# Patient Record
Sex: MALE | Race: WHITE | NOT HISPANIC OR LATINO
[De-identification: names, ages, dates, MRNs, and addresses within clinical notes are randomized per-mention and may not be internally consistent; named-entity substitution may affect disease eponyms.]

---

## 2018-05-31 ENCOUNTER — APPOINTMENT (OUTPATIENT)
Dept: GASTROENTEROLOGY | Facility: CLINIC | Age: 73
End: 2018-05-31
Payer: MEDICARE

## 2018-05-31 VITALS
HEART RATE: 78 BPM | DIASTOLIC BLOOD PRESSURE: 80 MMHG | HEIGHT: 66 IN | SYSTOLIC BLOOD PRESSURE: 120 MMHG | OXYGEN SATURATION: 98 % | WEIGHT: 225 LBS | BODY MASS INDEX: 36.16 KG/M2

## 2018-05-31 DIAGNOSIS — B18.2 CHRONIC VIRAL HEPATITIS C: ICD-10-CM

## 2018-05-31 DIAGNOSIS — Z86.010 PERSONAL HISTORY OF COLONIC POLYPS: ICD-10-CM

## 2018-05-31 DIAGNOSIS — Z86.19 PERSONAL HISTORY OF OTHER INFECTIOUS AND PARASITIC DISEASES: ICD-10-CM

## 2018-05-31 PROCEDURE — 99204 OFFICE O/P NEW MOD 45 MIN: CPT

## 2018-06-07 ENCOUNTER — CHART COPY (OUTPATIENT)
Age: 73
End: 2018-06-07

## 2018-06-07 RX ORDER — POLYETHYLENE GLYCOL-3350 AND ELECTROLYTES 236; 6.74; 5.86; 2.97; 22.74 G/274.31G; G/274.31G; G/274.31G; G/274.31G; G/274.31G
236 POWDER, FOR SOLUTION ORAL
Qty: 1 | Refills: 0 | Status: ACTIVE | COMMUNITY
Start: 2018-06-07 | End: 1900-01-01

## 2018-07-11 ENCOUNTER — APPOINTMENT (OUTPATIENT)
Dept: GASTROENTEROLOGY | Facility: AMBULATORY MEDICAL SERVICES | Age: 73
End: 2018-07-11
Payer: MEDICARE

## 2018-07-11 PROCEDURE — 45380 COLONOSCOPY AND BIOPSY: CPT

## 2021-11-08 ENCOUNTER — APPOINTMENT (OUTPATIENT)
Dept: ORTHOPEDIC SURGERY | Facility: CLINIC | Age: 76
End: 2021-11-08
Payer: MEDICARE

## 2021-11-08 VITALS
DIASTOLIC BLOOD PRESSURE: 81 MMHG | HEIGHT: 66 IN | WEIGHT: 230 LBS | SYSTOLIC BLOOD PRESSURE: 162 MMHG | BODY MASS INDEX: 36.96 KG/M2 | HEART RATE: 81 BPM

## 2021-11-08 PROCEDURE — 73562 X-RAY EXAM OF KNEE 3: CPT | Mod: 50

## 2021-11-08 PROCEDURE — 99203 OFFICE O/P NEW LOW 30 MIN: CPT

## 2021-11-08 RX ORDER — OXYCODONE HYDROCHLORIDE 15 MG/1
15 TABLET ORAL
Qty: 120 | Refills: 0 | Status: ACTIVE | COMMUNITY
Start: 2021-10-08

## 2021-11-08 RX ORDER — NAPROXEN 500 MG/1
500 TABLET ORAL
Qty: 180 | Refills: 0 | Status: ACTIVE | COMMUNITY
Start: 2021-05-07

## 2021-11-08 RX ORDER — BACLOFEN 10 MG/1
10 TABLET ORAL
Qty: 60 | Refills: 0 | Status: ACTIVE | COMMUNITY
Start: 2021-05-14

## 2021-11-08 RX ORDER — FUROSEMIDE 40 MG/1
40 TABLET ORAL
Qty: 90 | Refills: 0 | Status: ACTIVE | COMMUNITY
Start: 2021-08-27

## 2022-04-18 ENCOUNTER — APPOINTMENT (OUTPATIENT)
Dept: ORTHOPEDIC SURGERY | Facility: CLINIC | Age: 77
End: 2022-04-18
Payer: MEDICARE

## 2022-04-18 DIAGNOSIS — Z96.652 PRESENCE OF LEFT ARTIFICIAL KNEE JOINT: ICD-10-CM

## 2022-04-18 DIAGNOSIS — M17.11 UNILATERAL PRIMARY OSTEOARTHRITIS, RIGHT KNEE: ICD-10-CM

## 2022-04-18 PROCEDURE — 73562 X-RAY EXAM OF KNEE 3: CPT | Mod: RT

## 2022-04-18 PROCEDURE — 99213 OFFICE O/P EST LOW 20 MIN: CPT

## 2022-04-19 ENCOUNTER — OUTPATIENT (OUTPATIENT)
Dept: OUTPATIENT SERVICES | Facility: HOSPITAL | Age: 77
LOS: 1 days | End: 2022-04-19
Payer: MEDICARE

## 2022-04-19 VITALS
TEMPERATURE: 98 F | RESPIRATION RATE: 16 BRPM | HEART RATE: 89 BPM | SYSTOLIC BLOOD PRESSURE: 146 MMHG | DIASTOLIC BLOOD PRESSURE: 92 MMHG | WEIGHT: 231.93 LBS | HEIGHT: 66 IN | OXYGEN SATURATION: 97 %

## 2022-04-19 DIAGNOSIS — Z98.890 OTHER SPECIFIED POSTPROCEDURAL STATES: Chronic | ICD-10-CM

## 2022-04-19 DIAGNOSIS — M17.11 UNILATERAL PRIMARY OSTEOARTHRITIS, RIGHT KNEE: ICD-10-CM

## 2022-04-19 DIAGNOSIS — Z96.652 PRESENCE OF LEFT ARTIFICIAL KNEE JOINT: Chronic | ICD-10-CM

## 2022-04-19 LAB
A1C WITH ESTIMATED AVERAGE GLUCOSE RESULT: 5.6 % — SIGNIFICANT CHANGE UP (ref 4–5.6)
ALBUMIN SERPL ELPH-MCNC: 4.4 G/DL — SIGNIFICANT CHANGE UP (ref 3.3–5)
ALP SERPL-CCNC: 68 U/L — SIGNIFICANT CHANGE UP (ref 40–120)
ALT FLD-CCNC: 43 U/L — HIGH (ref 4–41)
ANION GAP SERPL CALC-SCNC: 13 MMOL/L — SIGNIFICANT CHANGE UP (ref 7–14)
APPEARANCE UR: CLEAR — SIGNIFICANT CHANGE UP
AST SERPL-CCNC: 26 U/L — SIGNIFICANT CHANGE UP (ref 4–40)
BILIRUB SERPL-MCNC: 0.4 MG/DL — SIGNIFICANT CHANGE UP (ref 0.2–1.2)
BILIRUB UR-MCNC: NEGATIVE — SIGNIFICANT CHANGE UP
BLD GP AB SCN SERPL QL: NEGATIVE — SIGNIFICANT CHANGE UP
BUN SERPL-MCNC: 13 MG/DL — SIGNIFICANT CHANGE UP (ref 7–23)
CALCIUM SERPL-MCNC: 9.2 MG/DL — SIGNIFICANT CHANGE UP (ref 8.4–10.5)
CHLORIDE SERPL-SCNC: 105 MMOL/L — SIGNIFICANT CHANGE UP (ref 98–107)
CO2 SERPL-SCNC: 22 MMOL/L — SIGNIFICANT CHANGE UP (ref 22–31)
COLOR SPEC: YELLOW — SIGNIFICANT CHANGE UP
CREAT SERPL-MCNC: 1.04 MG/DL — SIGNIFICANT CHANGE UP (ref 0.5–1.3)
DIFF PNL FLD: NEGATIVE — SIGNIFICANT CHANGE UP
EGFR: 74 ML/MIN/1.73M2 — SIGNIFICANT CHANGE UP
ESTIMATED AVERAGE GLUCOSE: 114 — SIGNIFICANT CHANGE UP
GLUCOSE SERPL-MCNC: 108 MG/DL — HIGH (ref 70–99)
GLUCOSE UR QL: NEGATIVE — SIGNIFICANT CHANGE UP
HCT VFR BLD CALC: 43.1 % — SIGNIFICANT CHANGE UP (ref 39–50)
HGB BLD-MCNC: 14 G/DL — SIGNIFICANT CHANGE UP (ref 13–17)
KETONES UR-MCNC: ABNORMAL
LEUKOCYTE ESTERASE UR-ACNC: NEGATIVE — SIGNIFICANT CHANGE UP
MCHC RBC-ENTMCNC: 28.6 PG — SIGNIFICANT CHANGE UP (ref 27–34)
MCHC RBC-ENTMCNC: 32.5 GM/DL — SIGNIFICANT CHANGE UP (ref 32–36)
MCV RBC AUTO: 88 FL — SIGNIFICANT CHANGE UP (ref 80–100)
MRSA PCR RESULT.: SIGNIFICANT CHANGE UP
NITRITE UR-MCNC: NEGATIVE — SIGNIFICANT CHANGE UP
NRBC # BLD: 0 /100 WBCS — SIGNIFICANT CHANGE UP
NRBC # FLD: 0 K/UL — SIGNIFICANT CHANGE UP
PH UR: 5 — SIGNIFICANT CHANGE UP (ref 5–8)
PLATELET # BLD AUTO: 286 K/UL — SIGNIFICANT CHANGE UP (ref 150–400)
POTASSIUM SERPL-MCNC: 4.2 MMOL/L — SIGNIFICANT CHANGE UP (ref 3.5–5.3)
POTASSIUM SERPL-SCNC: 4.2 MMOL/L — SIGNIFICANT CHANGE UP (ref 3.5–5.3)
PROT SERPL-MCNC: 7.5 G/DL — SIGNIFICANT CHANGE UP (ref 6–8.3)
PROT UR-MCNC: NEGATIVE — SIGNIFICANT CHANGE UP
RBC # BLD: 4.9 M/UL — SIGNIFICANT CHANGE UP (ref 4.2–5.8)
RBC # FLD: 14 % — SIGNIFICANT CHANGE UP (ref 10.3–14.5)
RH IG SCN BLD-IMP: POSITIVE — SIGNIFICANT CHANGE UP
S AUREUS DNA NOSE QL NAA+PROBE: SIGNIFICANT CHANGE UP
SODIUM SERPL-SCNC: 140 MMOL/L — SIGNIFICANT CHANGE UP (ref 135–145)
SP GR SPEC: 1.02 — SIGNIFICANT CHANGE UP (ref 1–1.05)
UROBILINOGEN FLD QL: SIGNIFICANT CHANGE UP
WBC # BLD: 8.2 K/UL — SIGNIFICANT CHANGE UP (ref 3.8–10.5)
WBC # FLD AUTO: 8.2 K/UL — SIGNIFICANT CHANGE UP (ref 3.8–10.5)

## 2022-04-19 PROCEDURE — 93010 ELECTROCARDIOGRAM REPORT: CPT

## 2022-04-19 RX ORDER — SODIUM CHLORIDE 9 MG/ML
1000 INJECTION, SOLUTION INTRAVENOUS
Refills: 0 | Status: DISCONTINUED | OUTPATIENT
Start: 2022-05-05 | End: 2022-05-08

## 2022-04-19 RX ORDER — SODIUM CHLORIDE 9 MG/ML
3 INJECTION INTRAMUSCULAR; INTRAVENOUS; SUBCUTANEOUS EVERY 8 HOURS
Refills: 0 | Status: DISCONTINUED | OUTPATIENT
Start: 2022-05-05 | End: 2022-05-08

## 2022-04-19 RX ORDER — OMEGA-3 ACID ETHYL ESTERS 1 G
1 CAPSULE ORAL
Qty: 0 | Refills: 0 | DISCHARGE

## 2022-04-19 RX ORDER — PANTOPRAZOLE SODIUM 20 MG/1
40 TABLET, DELAYED RELEASE ORAL
Refills: 0 | Status: DISCONTINUED | OUTPATIENT
Start: 2022-05-05 | End: 2022-05-08

## 2022-04-19 NOTE — H&P PST ADULT - NSANTHOSAYNRD_GEN_A_CORE
No. RAYA screening performed.  STOP BANG Legend: 0-2 = LOW Risk; 3-4 = INTERMEDIATE Risk; 5-8 = HIGH Risk

## 2022-04-19 NOTE — H&P PST ADULT - MUSCULOSKELETAL COMMENTS
preop dx of primary OA of right knee. see HPI +crepitus of right knee with flexion and extension. preop dx of unilateral primary OA, right knee. See HPI

## 2022-04-19 NOTE — H&P PST ADULT - NSICDXFAMILYHX_GEN_ALL_CORE_FT
FAMILY HISTORY:  Mother  Still living? Unknown  FH: ovarian cancer, Age at diagnosis: Age Unknown

## 2022-04-19 NOTE — H&P PST ADULT - NSICDXPASTMEDICALHX_GEN_ALL_CORE_FT
PAST MEDICAL HISTORY:  History of hepatitis C 7 years ago, treated    Primary osteoarthritis of right knee

## 2022-04-19 NOTE — H&P PST ADULT - PROBLEM SELECTOR PLAN 1
preop for right total knee replacement on 5/5/22  preop instructions given, pt verbalized understanding  GI prophylaxis and chlorhexidine wash provided  pt is scheduled for COVID testing preop  medical evaluation requested- pt going for high risk surgery preop for right total knee replacement on 5/5/22  preop instructions given, pt verbalized understanding  GI prophylaxis and chlorhexidine wash provided  pt is scheduled for COVID testing preop  medical evaluation requested- pt going for high risk surgery in the presence of elevated diastolic BP

## 2022-04-19 NOTE — H&P PST ADULT - HISTORY OF PRESENT ILLNESS
77 y/o male osteoarthritis of the right knee presents to PST preop for right total knee replacement. pt reports right knee pain x1 year. s/p multiple right knee injections but pt states relief would only last for three months. pt reports "clicking" to the right knee and difficulty climbing stairs.

## 2022-04-19 NOTE — H&P PST ADULT - NSICDXPASTSURGICALHX_GEN_ALL_CORE_FT
PAST SURGICAL HISTORY:  Status post left partial knee replacement      PAST SURGICAL HISTORY:  History of hip surgery s/p excision of right hip fatty tumor 2015    S/P arthroscopy of left shoulder 3/2016    Status post left partial knee replacement 12/2017

## 2022-04-20 ENCOUNTER — TRANSCRIPTION ENCOUNTER (OUTPATIENT)
Age: 77
End: 2022-04-20

## 2022-04-20 LAB
CULTURE RESULTS: NO GROWTH — SIGNIFICANT CHANGE UP
SPECIMEN SOURCE: SIGNIFICANT CHANGE UP

## 2022-05-04 ENCOUNTER — FORM ENCOUNTER (OUTPATIENT)
Age: 77
End: 2022-05-04

## 2022-05-04 NOTE — ASU PATIENT PROFILE, ADULT - TEACHING/LEARNING LEARNING PREFERENCES
Spine appears normal, range of motion is not limited, no muscle or joint tenderness
skill demonstration/verbal instruction/video/written material

## 2022-05-04 NOTE — ASU PATIENT PROFILE, ADULT - FALL HARM RISK - UNIVERSAL INTERVENTIONS
Bed in lowest position, wheels locked, appropriate side rails in place/Call bell, personal items and telephone in reach/Instruct patient to call for assistance before getting out of bed or chair/Non-slip footwear when patient is out of bed/[place] to call system/Physically safe environment - no spills, clutter or unnecessary equipment/Purposeful Proactive Rounding/Room/bathroom lighting operational, light cord in reach

## 2022-05-04 NOTE — ASU PATIENT PROFILE, ADULT - NSICDXPASTSURGICALHX_GEN_ALL_CORE_FT
PAST SURGICAL HISTORY:  History of hip surgery s/p excision of right hip fatty tumor 2015    S/P arthroscopy of left shoulder 3/2016    Status post left partial knee replacement 12/2017

## 2022-05-05 ENCOUNTER — INPATIENT (INPATIENT)
Facility: HOSPITAL | Age: 77
LOS: 2 days | Discharge: HOME HEALTH | End: 2022-05-08
Attending: ORTHOPAEDIC SURGERY | Admitting: ORTHOPAEDIC SURGERY
Payer: MEDICARE

## 2022-05-05 ENCOUNTER — APPOINTMENT (OUTPATIENT)
Dept: ORTHOPEDIC SURGERY | Facility: HOSPITAL | Age: 77
End: 2022-05-05

## 2022-05-05 ENCOUNTER — RESULT REVIEW (OUTPATIENT)
Age: 77
End: 2022-05-05

## 2022-05-05 VITALS
TEMPERATURE: 97 F | SYSTOLIC BLOOD PRESSURE: 147 MMHG | HEIGHT: 66 IN | RESPIRATION RATE: 18 BRPM | HEART RATE: 69 BPM | WEIGHT: 231.93 LBS | OXYGEN SATURATION: 95 % | DIASTOLIC BLOOD PRESSURE: 78 MMHG

## 2022-05-05 DIAGNOSIS — M17.11 UNILATERAL PRIMARY OSTEOARTHRITIS, RIGHT KNEE: ICD-10-CM

## 2022-05-05 DIAGNOSIS — Z98.890 OTHER SPECIFIED POSTPROCEDURAL STATES: Chronic | ICD-10-CM

## 2022-05-05 DIAGNOSIS — Z96.652 PRESENCE OF LEFT ARTIFICIAL KNEE JOINT: Chronic | ICD-10-CM

## 2022-05-05 LAB
ANION GAP SERPL CALC-SCNC: 11 MMOL/L — SIGNIFICANT CHANGE UP (ref 7–14)
BUN SERPL-MCNC: 12 MG/DL — SIGNIFICANT CHANGE UP (ref 7–23)
CALCIUM SERPL-MCNC: 9 MG/DL — SIGNIFICANT CHANGE UP (ref 8.4–10.5)
CHLORIDE SERPL-SCNC: 106 MMOL/L — SIGNIFICANT CHANGE UP (ref 98–107)
CO2 SERPL-SCNC: 23 MMOL/L — SIGNIFICANT CHANGE UP (ref 22–31)
CREAT SERPL-MCNC: 0.98 MG/DL — SIGNIFICANT CHANGE UP (ref 0.5–1.3)
EGFR: 80 ML/MIN/1.73M2 — SIGNIFICANT CHANGE UP
GLUCOSE BLDC GLUCOMTR-MCNC: 108 MG/DL — HIGH (ref 70–99)
GLUCOSE SERPL-MCNC: 127 MG/DL — HIGH (ref 70–99)
HCT VFR BLD CALC: 41.7 % — SIGNIFICANT CHANGE UP (ref 39–50)
HGB BLD-MCNC: 13.9 G/DL — SIGNIFICANT CHANGE UP (ref 13–17)
MCHC RBC-ENTMCNC: 29.1 PG — SIGNIFICANT CHANGE UP (ref 27–34)
MCHC RBC-ENTMCNC: 33.3 GM/DL — SIGNIFICANT CHANGE UP (ref 32–36)
MCV RBC AUTO: 87.2 FL — SIGNIFICANT CHANGE UP (ref 80–100)
NRBC # BLD: 0 /100 WBCS — SIGNIFICANT CHANGE UP
NRBC # FLD: 0 K/UL — SIGNIFICANT CHANGE UP
PLATELET # BLD AUTO: 270 K/UL — SIGNIFICANT CHANGE UP (ref 150–400)
POTASSIUM SERPL-MCNC: 4.2 MMOL/L — SIGNIFICANT CHANGE UP (ref 3.5–5.3)
POTASSIUM SERPL-SCNC: 4.2 MMOL/L — SIGNIFICANT CHANGE UP (ref 3.5–5.3)
RBC # BLD: 4.78 M/UL — SIGNIFICANT CHANGE UP (ref 4.2–5.8)
RBC # FLD: 13.7 % — SIGNIFICANT CHANGE UP (ref 10.3–14.5)
SODIUM SERPL-SCNC: 140 MMOL/L — SIGNIFICANT CHANGE UP (ref 135–145)
WBC # BLD: 8.89 K/UL — SIGNIFICANT CHANGE UP (ref 3.8–10.5)
WBC # FLD AUTO: 8.89 K/UL — SIGNIFICANT CHANGE UP (ref 3.8–10.5)

## 2022-05-05 PROCEDURE — 88311 DECALCIFY TISSUE: CPT | Mod: 26

## 2022-05-05 PROCEDURE — 73560 X-RAY EXAM OF KNEE 1 OR 2: CPT | Mod: 26,RT

## 2022-05-05 PROCEDURE — 88305 TISSUE EXAM BY PATHOLOGIST: CPT | Mod: 26

## 2022-05-05 PROCEDURE — 27447 TOTAL KNEE ARTHROPLASTY: CPT | Mod: RT

## 2022-05-05 DEVICE — IMPLANTABLE DEVICE: Type: IMPLANTABLE DEVICE | Site: RIGHT | Status: FUNCTIONAL

## 2022-05-05 DEVICE — ZIMMER/NEXGEN SMOOTH PIN 3.2X75MM: Type: IMPLANTABLE DEVICE | Site: RIGHT | Status: FUNCTIONAL

## 2022-05-05 DEVICE — STEM TIB PSN SZ 5 DEG G R: Type: IMPLANTABLE DEVICE | Site: RIGHT | Status: FUNCTIONAL

## 2022-05-05 DEVICE — ZIMMER FEMALE HEX SCREW MAGNETIC 2.5MM X 25MM: Type: IMPLANTABLE DEVICE | Site: RIGHT | Status: FUNCTIONAL

## 2022-05-05 DEVICE — SCREW HEX HEADED 3.5X48MM: Type: IMPLANTABLE DEVICE | Site: RIGHT | Status: FUNCTIONAL

## 2022-05-05 DEVICE — CEMENT SIMPLEX P 40GM: Type: IMPLANTABLE DEVICE | Site: RIGHT | Status: FUNCTIONAL

## 2022-05-05 DEVICE — PATELLA  ALL POLY VE 32MM: Type: IMPLANTABLE DEVICE | Site: RIGHT | Status: FUNCTIONAL

## 2022-05-05 RX ORDER — SENNA PLUS 8.6 MG/1
2 TABLET ORAL AT BEDTIME
Refills: 0 | Status: DISCONTINUED | OUTPATIENT
Start: 2022-05-05 | End: 2022-05-08

## 2022-05-05 RX ORDER — ONDANSETRON 8 MG/1
4 TABLET, FILM COATED ORAL ONCE
Refills: 0 | Status: DISCONTINUED | OUTPATIENT
Start: 2022-05-05 | End: 2022-05-05

## 2022-05-05 RX ORDER — HYDROMORPHONE HYDROCHLORIDE 2 MG/ML
0.5 INJECTION INTRAMUSCULAR; INTRAVENOUS; SUBCUTANEOUS ONCE
Refills: 0 | Status: DISCONTINUED | OUTPATIENT
Start: 2022-05-05 | End: 2022-05-08

## 2022-05-05 RX ORDER — OXYCODONE HYDROCHLORIDE 5 MG/1
10 TABLET ORAL EVERY 4 HOURS
Refills: 0 | Status: DISCONTINUED | OUTPATIENT
Start: 2022-05-05 | End: 2022-05-05

## 2022-05-05 RX ORDER — HYDROMORPHONE HYDROCHLORIDE 2 MG/ML
1 INJECTION INTRAMUSCULAR; INTRAVENOUS; SUBCUTANEOUS
Refills: 0 | Status: DISCONTINUED | OUTPATIENT
Start: 2022-05-05 | End: 2022-05-05

## 2022-05-05 RX ORDER — OXYCODONE HYDROCHLORIDE 5 MG/1
5 TABLET ORAL ONCE
Refills: 0 | Status: DISCONTINUED | OUTPATIENT
Start: 2022-05-05 | End: 2022-05-05

## 2022-05-05 RX ORDER — PANTOPRAZOLE SODIUM 20 MG/1
40 TABLET, DELAYED RELEASE ORAL
Refills: 0 | Status: DISCONTINUED | OUTPATIENT
Start: 2022-05-05 | End: 2022-05-08

## 2022-05-05 RX ORDER — SODIUM CHLORIDE 9 MG/ML
1000 INJECTION, SOLUTION INTRAVENOUS
Refills: 0 | Status: DISCONTINUED | OUTPATIENT
Start: 2022-05-05 | End: 2022-05-05

## 2022-05-05 RX ORDER — SODIUM CHLORIDE 9 MG/ML
500 INJECTION, SOLUTION INTRAVENOUS ONCE
Refills: 0 | Status: COMPLETED | OUTPATIENT
Start: 2022-05-05 | End: 2022-05-05

## 2022-05-05 RX ORDER — ASPIRIN/CALCIUM CARB/MAGNESIUM 324 MG
325 TABLET ORAL
Refills: 0 | Status: DISCONTINUED | OUTPATIENT
Start: 2022-05-05 | End: 2022-05-08

## 2022-05-05 RX ORDER — ONDANSETRON 8 MG/1
4 TABLET, FILM COATED ORAL EVERY 6 HOURS
Refills: 0 | Status: DISCONTINUED | OUTPATIENT
Start: 2022-05-05 | End: 2022-05-08

## 2022-05-05 RX ORDER — SODIUM CHLORIDE 9 MG/ML
500 INJECTION, SOLUTION INTRAVENOUS ONCE
Refills: 0 | Status: COMPLETED | OUTPATIENT
Start: 2022-05-06 | End: 2022-05-06

## 2022-05-05 RX ORDER — ACETAMINOPHEN 500 MG
975 TABLET ORAL EVERY 8 HOURS
Refills: 0 | Status: DISCONTINUED | OUTPATIENT
Start: 2022-05-05 | End: 2022-05-08

## 2022-05-05 RX ORDER — SODIUM CHLORIDE 9 MG/ML
1000 INJECTION, SOLUTION INTRAVENOUS
Refills: 0 | Status: DISCONTINUED | OUTPATIENT
Start: 2022-05-06 | End: 2022-05-08

## 2022-05-05 RX ORDER — KETOROLAC TROMETHAMINE 30 MG/ML
15 SYRINGE (ML) INJECTION EVERY 6 HOURS
Refills: 0 | Status: DISCONTINUED | OUTPATIENT
Start: 2022-05-05 | End: 2022-05-06

## 2022-05-05 RX ORDER — HYDROMORPHONE HYDROCHLORIDE 2 MG/ML
0.5 INJECTION INTRAMUSCULAR; INTRAVENOUS; SUBCUTANEOUS
Refills: 0 | Status: DISCONTINUED | OUTPATIENT
Start: 2022-05-05 | End: 2022-05-05

## 2022-05-05 RX ORDER — MAGNESIUM HYDROXIDE 400 MG/1
30 TABLET, CHEWABLE ORAL DAILY
Refills: 0 | Status: DISCONTINUED | OUTPATIENT
Start: 2022-05-05 | End: 2022-05-08

## 2022-05-05 RX ORDER — OXYCODONE HYDROCHLORIDE 5 MG/1
5 TABLET ORAL EVERY 4 HOURS
Refills: 0 | Status: DISCONTINUED | OUTPATIENT
Start: 2022-05-05 | End: 2022-05-05

## 2022-05-05 RX ORDER — GABAPENTIN 400 MG/1
600 CAPSULE ORAL
Refills: 0 | Status: DISCONTINUED | OUTPATIENT
Start: 2022-05-05 | End: 2022-05-08

## 2022-05-05 RX ORDER — TRAMADOL HYDROCHLORIDE 50 MG/1
25 TABLET ORAL ONCE
Refills: 0 | Status: DISCONTINUED | OUTPATIENT
Start: 2022-05-05 | End: 2022-05-05

## 2022-05-05 RX ORDER — ACETAMINOPHEN 500 MG
1000 TABLET ORAL ONCE
Refills: 0 | Status: COMPLETED | OUTPATIENT
Start: 2022-05-05 | End: 2022-05-05

## 2022-05-05 RX ORDER — TRAMADOL HYDROCHLORIDE 50 MG/1
50 TABLET ORAL EVERY 6 HOURS
Refills: 0 | Status: DISCONTINUED | OUTPATIENT
Start: 2022-05-05 | End: 2022-05-08

## 2022-05-05 RX ORDER — OXYCODONE HYDROCHLORIDE 5 MG/1
10 TABLET ORAL EVERY 4 HOURS
Refills: 0 | Status: DISCONTINUED | OUTPATIENT
Start: 2022-05-05 | End: 2022-05-08

## 2022-05-05 RX ORDER — CEFAZOLIN SODIUM 1 G
2000 VIAL (EA) INJECTION EVERY 8 HOURS
Refills: 0 | Status: COMPLETED | OUTPATIENT
Start: 2022-05-05 | End: 2022-05-06

## 2022-05-05 RX ORDER — OXYCODONE HYDROCHLORIDE 5 MG/1
15 TABLET ORAL EVERY 4 HOURS
Refills: 0 | Status: DISCONTINUED | OUTPATIENT
Start: 2022-05-05 | End: 2022-05-08

## 2022-05-05 RX ORDER — OXYCODONE HYDROCHLORIDE 5 MG/1
10 TABLET ORAL ONCE
Refills: 0 | Status: DISCONTINUED | OUTPATIENT
Start: 2022-05-05 | End: 2022-05-05

## 2022-05-05 RX ADMIN — Medication 15 MILLIGRAM(S): at 19:32

## 2022-05-05 RX ADMIN — Medication 975 MILLIGRAM(S): at 20:04

## 2022-05-05 RX ADMIN — Medication 75 MILLIGRAM(S): at 11:16

## 2022-05-05 RX ADMIN — HYDROMORPHONE HYDROCHLORIDE 1 MILLIGRAM(S): 2 INJECTION INTRAMUSCULAR; INTRAVENOUS; SUBCUTANEOUS at 17:25

## 2022-05-05 RX ADMIN — HYDROMORPHONE HYDROCHLORIDE 0.5 MILLIGRAM(S): 2 INJECTION INTRAMUSCULAR; INTRAVENOUS; SUBCUTANEOUS at 16:32

## 2022-05-05 RX ADMIN — SODIUM CHLORIDE 3 MILLILITER(S): 9 INJECTION INTRAMUSCULAR; INTRAVENOUS; SUBCUTANEOUS at 22:35

## 2022-05-05 RX ADMIN — Medication 325 MILLIGRAM(S): at 19:32

## 2022-05-05 RX ADMIN — SODIUM CHLORIDE 30 MILLILITER(S): 9 INJECTION, SOLUTION INTRAVENOUS at 21:04

## 2022-05-05 RX ADMIN — Medication 400 MILLIGRAM(S): at 11:16

## 2022-05-05 RX ADMIN — TRAMADOL HYDROCHLORIDE 25 MILLIGRAM(S): 50 TABLET ORAL at 11:16

## 2022-05-05 RX ADMIN — SODIUM CHLORIDE 500 MILLILITER(S): 9 INJECTION, SOLUTION INTRAVENOUS at 18:59

## 2022-05-05 RX ADMIN — TRAMADOL HYDROCHLORIDE 50 MILLIGRAM(S): 50 TABLET ORAL at 23:39

## 2022-05-05 RX ADMIN — Medication 100 MILLIGRAM(S): at 20:04

## 2022-05-05 RX ADMIN — HYDROMORPHONE HYDROCHLORIDE 1 MILLIGRAM(S): 2 INJECTION INTRAMUSCULAR; INTRAVENOUS; SUBCUTANEOUS at 17:45

## 2022-05-05 RX ADMIN — OXYCODONE HYDROCHLORIDE 15 MILLIGRAM(S): 5 TABLET ORAL at 21:05

## 2022-05-05 RX ADMIN — SENNA PLUS 2 TABLET(S): 8.6 TABLET ORAL at 21:05

## 2022-05-05 RX ADMIN — HYDROMORPHONE HYDROCHLORIDE 0.5 MILLIGRAM(S): 2 INJECTION INTRAMUSCULAR; INTRAVENOUS; SUBCUTANEOUS at 16:45

## 2022-05-05 RX ADMIN — SODIUM CHLORIDE 75 MILLILITER(S): 9 INJECTION, SOLUTION INTRAVENOUS at 16:33

## 2022-05-05 RX ADMIN — TRAMADOL HYDROCHLORIDE 50 MILLIGRAM(S): 50 TABLET ORAL at 23:16

## 2022-05-05 RX ADMIN — GABAPENTIN 600 MILLIGRAM(S): 400 CAPSULE ORAL at 21:04

## 2022-05-05 RX ADMIN — OXYCODONE HYDROCHLORIDE 15 MILLIGRAM(S): 5 TABLET ORAL at 22:07

## 2022-05-05 NOTE — ASU PREOP CHECKLIST - NS PREOP CHK TEST_COVID RESULT_GEN_ALL_CORE
Problem: Inpatient Physical Therapy  Goal: Gait Training Goal LTG- PT  Outcome: Unable to achieve outcome(s) by discharge Date Met: 03/09/18 03/08/18 1153 03/09/18 1600   Gait Training PT LTG   Gait Training Goal PT LTG, Date Established 03/08/18 --    Gait Training Goal PT LTG, Time to Achieve by discharge --    Gait Training Goal PT LTG, [place] Level supervision required --    Gait Training Goal PT LTG, Distance to Achieve 800 feet no LOB --    Gait Training Goal PT LTG, Date Goal Reviewed --  03/09/18   Gait Training Goal PT LTG, Outcome --  goal not met   Gait Training Goal PT LTG, Reason Goal Not Met --  discharged from facility     Goal: Dynamic Standing Balance Goal LTG- PT  Outcome: Unable to achieve outcome(s) by discharge Date Met: 03/09/18 03/08/18 1153 03/09/18 1600   Dynamic Standing Balance PT LTG   Dynamic Standing Balance PT LTG, Date Established 03/08/18 --    Dynamic Standing Balance PT LTG, Time to Achieve by discharge --    Dynamic Standing Balance PT LTG, [place] Level supervision required --    Dynamic Standing Balance PT LTG, Additional Goal side stepping, backwards walking, tandem walking 20 steps no LOB --    Dynamic Standing Balance PT LTG, Date Goal Reviewed --  03/09/18   Dynamic Standing Balance PT LTG, Outcome --  goal not met   Dynamic Standing Balance PT LTG, Reason Goal Not Met --  discharged from facility          Negative

## 2022-05-05 NOTE — PATIENT PROFILE ADULT - FALL HARM RISK - HARM RISK INTERVENTIONS
Assistance with ambulation/Assistance OOB with selected safe patient handling equipment/Communicate Risk of Fall with Harm to all staff/Discuss with provider need for PT consult/Monitor gait and stability/Provide patient with walking aids - walker, cane, crutches/Reinforce activity limits and safety measures with patient and family/Sit up slowly, dangle for a short time, stand at bedside before walking/Tailored Fall Risk Interventions/Use of alarms - bed, chair and/or voice tab/Visual Cue: Yellow wristband and red socks/Bed in lowest position, wheels locked, appropriate side rails in place/Call bell, personal items and telephone in reach/Instruct patient to call for assistance before getting out of bed or chair/Non-slip footwear when patient is out of bed/[place] to call system/Physically safe environment - no spills, clutter or unnecessary equipment/Purposeful Proactive Rounding/Room/bathroom lighting operational, light cord in reach

## 2022-05-06 ENCOUNTER — TRANSCRIPTION ENCOUNTER (OUTPATIENT)
Age: 77
End: 2022-05-06

## 2022-05-06 DIAGNOSIS — D72.829 ELEVATED WHITE BLOOD CELL COUNT, UNSPECIFIED: ICD-10-CM

## 2022-05-06 LAB
ANION GAP SERPL CALC-SCNC: 12 MMOL/L — SIGNIFICANT CHANGE UP (ref 7–14)
BUN SERPL-MCNC: 20 MG/DL — SIGNIFICANT CHANGE UP (ref 7–23)
CALCIUM SERPL-MCNC: 8.4 MG/DL — SIGNIFICANT CHANGE UP (ref 8.4–10.5)
CHLORIDE SERPL-SCNC: 101 MMOL/L — SIGNIFICANT CHANGE UP (ref 98–107)
CO2 SERPL-SCNC: 22 MMOL/L — SIGNIFICANT CHANGE UP (ref 22–31)
CREAT SERPL-MCNC: 1.23 MG/DL — SIGNIFICANT CHANGE UP (ref 0.5–1.3)
EGFR: 61 ML/MIN/1.73M2 — SIGNIFICANT CHANGE UP
GLUCOSE SERPL-MCNC: 141 MG/DL — HIGH (ref 70–99)
HCT VFR BLD CALC: 35.9 % — LOW (ref 39–50)
HGB BLD-MCNC: 11.8 G/DL — LOW (ref 13–17)
MCHC RBC-ENTMCNC: 29.4 PG — SIGNIFICANT CHANGE UP (ref 27–34)
MCHC RBC-ENTMCNC: 32.9 GM/DL — SIGNIFICANT CHANGE UP (ref 32–36)
MCV RBC AUTO: 89.3 FL — SIGNIFICANT CHANGE UP (ref 80–100)
NRBC # BLD: 0 /100 WBCS — SIGNIFICANT CHANGE UP
NRBC # FLD: 0 K/UL — SIGNIFICANT CHANGE UP
PLATELET # BLD AUTO: 245 K/UL — SIGNIFICANT CHANGE UP (ref 150–400)
POTASSIUM SERPL-MCNC: 4.5 MMOL/L — SIGNIFICANT CHANGE UP (ref 3.5–5.3)
POTASSIUM SERPL-SCNC: 4.5 MMOL/L — SIGNIFICANT CHANGE UP (ref 3.5–5.3)
RBC # BLD: 4.02 M/UL — LOW (ref 4.2–5.8)
RBC # FLD: 14 % — SIGNIFICANT CHANGE UP (ref 10.3–14.5)
SODIUM SERPL-SCNC: 135 MMOL/L — SIGNIFICANT CHANGE UP (ref 135–145)
WBC # BLD: 15.67 K/UL — HIGH (ref 3.8–10.5)
WBC # FLD AUTO: 15.67 K/UL — HIGH (ref 3.8–10.5)

## 2022-05-06 PROCEDURE — 99223 1ST HOSP IP/OBS HIGH 75: CPT

## 2022-05-06 RX ADMIN — OXYCODONE HYDROCHLORIDE 15 MILLIGRAM(S): 5 TABLET ORAL at 11:48

## 2022-05-06 RX ADMIN — OXYCODONE HYDROCHLORIDE 15 MILLIGRAM(S): 5 TABLET ORAL at 22:00

## 2022-05-06 RX ADMIN — PANTOPRAZOLE SODIUM 40 MILLIGRAM(S): 20 TABLET, DELAYED RELEASE ORAL at 05:50

## 2022-05-06 RX ADMIN — SODIUM CHLORIDE 500 MILLILITER(S): 9 INJECTION, SOLUTION INTRAVENOUS at 05:51

## 2022-05-06 RX ADMIN — MAGNESIUM HYDROXIDE 30 MILLILITER(S): 400 TABLET, CHEWABLE ORAL at 18:29

## 2022-05-06 RX ADMIN — Medication 975 MILLIGRAM(S): at 05:51

## 2022-05-06 RX ADMIN — SENNA PLUS 2 TABLET(S): 8.6 TABLET ORAL at 21:38

## 2022-05-06 RX ADMIN — OXYCODONE HYDROCHLORIDE 15 MILLIGRAM(S): 5 TABLET ORAL at 02:39

## 2022-05-06 RX ADMIN — Medication 975 MILLIGRAM(S): at 21:38

## 2022-05-06 RX ADMIN — SODIUM CHLORIDE 3 MILLILITER(S): 9 INJECTION INTRAMUSCULAR; INTRAVENOUS; SUBCUTANEOUS at 21:46

## 2022-05-06 RX ADMIN — OXYCODONE HYDROCHLORIDE 15 MILLIGRAM(S): 5 TABLET ORAL at 16:29

## 2022-05-06 RX ADMIN — SODIUM CHLORIDE 3 MILLILITER(S): 9 INJECTION INTRAMUSCULAR; INTRAVENOUS; SUBCUTANEOUS at 15:00

## 2022-05-06 RX ADMIN — OXYCODONE HYDROCHLORIDE 15 MILLIGRAM(S): 5 TABLET ORAL at 06:46

## 2022-05-06 RX ADMIN — OXYCODONE HYDROCHLORIDE 15 MILLIGRAM(S): 5 TABLET ORAL at 01:15

## 2022-05-06 RX ADMIN — Medication 15 MILLIGRAM(S): at 01:18

## 2022-05-06 RX ADMIN — OXYCODONE HYDROCHLORIDE 15 MILLIGRAM(S): 5 TABLET ORAL at 10:48

## 2022-05-06 RX ADMIN — Medication 325 MILLIGRAM(S): at 18:29

## 2022-05-06 RX ADMIN — SODIUM CHLORIDE 3 MILLILITER(S): 9 INJECTION INTRAMUSCULAR; INTRAVENOUS; SUBCUTANEOUS at 06:41

## 2022-05-06 RX ADMIN — Medication 15 MILLIGRAM(S): at 14:05

## 2022-05-06 RX ADMIN — OXYCODONE HYDROCHLORIDE 15 MILLIGRAM(S): 5 TABLET ORAL at 21:38

## 2022-05-06 RX ADMIN — Medication 15 MILLIGRAM(S): at 07:54

## 2022-05-06 RX ADMIN — OXYCODONE HYDROCHLORIDE 15 MILLIGRAM(S): 5 TABLET ORAL at 07:22

## 2022-05-06 RX ADMIN — GABAPENTIN 600 MILLIGRAM(S): 400 CAPSULE ORAL at 05:50

## 2022-05-06 RX ADMIN — Medication 100 MILLIGRAM(S): at 03:42

## 2022-05-06 RX ADMIN — Medication 325 MILLIGRAM(S): at 05:50

## 2022-05-06 RX ADMIN — Medication 975 MILLIGRAM(S): at 13:59

## 2022-05-06 RX ADMIN — OXYCODONE HYDROCHLORIDE 15 MILLIGRAM(S): 5 TABLET ORAL at 15:29

## 2022-05-06 NOTE — DISCHARGE NOTE PROVIDER - HOSPITAL COURSE
77 y/o Male presents to [facility] for orthopedic surgery. Patient s/p right total knee arthroplasty with Dr. Abdi on 5/5/2022. Patient tolerated the procedure well without any intraoperative complications. Patient tolerated physical therapy well, pain is controlled. Pt is weight bearing as tolerated. Seen by medical attending for continuity of care and management and cleared for safe discharge. Keep dressing/incision clean, dry and intact. Any suture/staples to be removed on post-op day #14 at office visit. Pt is on  Aspirin 325mg twice daily for DVT prophylaxis, please take for 6 weeks unless otherwise instructed by your surgeon. Please follow up with Dr. Abdi in 2 weeks, call office to make appointment, [phone]. Please follow up with your PMD for continuity of care and management as medications may have changed.

## 2022-05-06 NOTE — DISCHARGE NOTE PROVIDER - NSDCFUSCHEDAPPT_GEN_ALL_CORE_FT
Kenneth Abdi  [facility]ician [facility]  OrthoSurg [address]  Scheduled Appointment: 05/23/2022

## 2022-05-06 NOTE — PHYSICAL THERAPY INITIAL EVALUATION ADULT - ASR WT BEARING STATUS EVAL
Laparoscopic BSO, lysis of adhesions, ebl minimal, no complications,   Specimen both ovary and tubes, swiltz assissting mwiedemann oct 19  Counts correct, 2 gms ancef   Right LE

## 2022-05-06 NOTE — DISCHARGE NOTE NURSING/CASE MANAGEMENT/SOCIAL WORK - PATIENT PORTAL LINK FT
You can access the FollowMyHealth Patient Portal offered by [facility] by registering at the following website: [email or web address]. By joining [facility]’s FollowMyHealth portal, you will also be able to view your health information using other applications (apps) compatible with our system.

## 2022-05-06 NOTE — OCCUPATIONAL THERAPY INITIAL EVALUATION ADULT - GENERAL OBSERVATIONS, REHAB EVAL
Pt. received semisupine in bed. No acute distress. Patient agreed to evaluation from Occupational Therapist. +Heplock, +Clean dry intact dressing to Right Knee.

## 2022-05-06 NOTE — OCCUPATIONAL THERAPY INITIAL EVALUATION ADULT - PERTINENT HX OF CURRENT PROBLEM, REHAB EVAL
Pt is a 76 year old male who reports right knee pain x1 year. Pt is s/p multiple right knee injections but pt states relief would only last for three months. Pt with dx of osteoarthritis of the right knee. Pt is now s/p right total knee replacement on 5/5/22.

## 2022-05-06 NOTE — CONSULT NOTE ADULT - SUBJECTIVE AND OBJECTIVE BOX
CHIEF COMPLAINT: Patient is a 76y old  Male who presents with a chief complaint of "right knee replacement" (19 Apr 2022 07:11)      HPI:    77 y/o male osteoarthritis of the right knee here for elective right total knee replacement. pt reports right knee pain x1 year. s/p multiple right knee injections but pt states relief would only last for three months. pt reports "clicking" to the right knee and difficulty climbing stairs. Now s/p TKR. Mild soreness surgical site. No CP/dyspnea, tolerating po intake.       Pain Symptoms if applicable:             	                           none	    mild         moderate         severe  	                            0	     1-3	      4-6	          7-10  Pain:  Location:	  Modifying factors:	  Associated symptoms:	    Allergies    No Known Allergies    Intolerances        HOME MEDICATIONS: [x] Reviewed    MEDICATIONS  (STANDING):  acetaminophen     Tablet .. 975 milliGRAM(s) Oral every 8 hours  aspirin enteric coated 325 milliGRAM(s) Oral two times a day  gabapentin 600 milliGRAM(s) Oral two times a day  HYDROmorphone  Injectable 0.5 milliGRAM(s) IV Push once  ketorolac   Injectable 15 milliGRAM(s) IV Push every 6 hours  lactated ringers. 1000 milliLiter(s) (150 mL/Hr) IV Continuous <Continuous>  lactated ringers. 1000 milliLiter(s) (30 mL/Hr) IV Continuous <Continuous>  pantoprazole    Tablet 40 milliGRAM(s) Oral before breakfast  pantoprazole    Tablet 40 milliGRAM(s) Oral before breakfast  senna 2 Tablet(s) Oral at bedtime  sodium chloride 0.9% lock flush 3 milliLiter(s) IV Push every 8 hours    MEDICATIONS  (PRN):  magnesium hydroxide Suspension 30 milliLiter(s) Oral daily PRN Constipation  ondansetron Injectable 4 milliGRAM(s) IV Push every 6 hours PRN Nausea and/or Vomiting  oxyCODONE    IR 15 milliGRAM(s) Oral every 4 hours PRN Severe Pain (7 - 10)  oxyCODONE    IR 10 milliGRAM(s) Oral every 4 hours PRN Moderate Pain (4 - 6)  traMADol 50 milliGRAM(s) Oral every 6 hours PRN Mild Pain (1 - 3)      PAST MEDICAL & SURGICAL HISTORY:  Primary osteoarthritis of right knee    History of hepatitis C  7 years ago, treated    Status post left partial knee replacement  12/2017    S/P arthroscopy of left shoulder  3/2016    History of hip surgery  s/p excision of right hip fatty tumor 2015    [ ] Reviewed     SOCIAL HISTORY:  [x] Substance abuse:  [x] Tobacco: stopped 15 yrs prior  [x] Alcohol use:     FAMILY HISTORY:  FH: ovarian cancer (Mother)    [x] No pertinent family history in first degree relatives     REVIEW OF SYSTEMS:    [x] All other ROS negative  [  ] Unable to obtain due to poor mental status    Vital Signs Last 24 Hrs  T(C): 36.7 (06 May 2022 10:00), Max: 37.4 (06 May 2022 01:54)  T(F): 98.1 (06 May 2022 10:00), Max: 99.3 (06 May 2022 01:54)  HR: 71 (06 May 2022 10:00) (67 - 97)  BP: 113/63 (06 May 2022 10:00) (105/60 - 148/91)  BP(mean): 96 (05 May 2022 18:30) (86 - 106)  RR: 17 (06 May 2022 10:00) (12 - 20)  SpO2: 96% (06 May 2022 10:00) (94% - 99%)    PHYSICAL EXAM:    GENERAL: NAD, well-groomed, well-developed  HEAD:  Atraumatic, Normocephalic  EYES: EOMI, PERRLA, conjunctiva and sclera clear  ENMT: Moist mucous membranes  NECK: Supple, No JVD  RESPIRATORY: Clear to auscultation bilaterally; No rales, rhonchi, wheezing, or rubs  CARDIOVASCULAR: Regular rate and rhythm; No murmurs, rubs, or gallops  GASTROINTESTINAL: Soft, Nontender, Nondistended; Bowel sounds present  GENITOURINARY: Not examined  EXTREMITIES:  2+ Peripheral Pulses, No clubbing, cyanosis, or edema  NERVOUS SYSTEM:  Alert & Oriented X3; Moving all 4 extremities; No gross sensory deficits  HEME/LYMPH: No lymphadenopathy noted  SKIN: No rashes or lesions; Incisions C/D/I    LABS:                        11.8   15.67 )-----------( 245      ( 06 May 2022 07:23 )             35.9     05-06    135  |  101  |  20  ----------------------------<  141<H>  4.5   |  22  |  1.23    Ca    8.4      06 May 2022 07:23          CAPILLARY BLOOD GLUCOSE      POCT Blood Glucose.: 108 mg/dL (05 May 2022 11:01)      RADIOLOGY & ADDITIONAL STUDIES:    EKG:   Personally Reviewed:  [x] YES     Imaging:   Personally Reviewed:  [x] YES               [ ] Consultant(s) Notes Reviewed  [x] Care Discussed with Consultants/Other Providers: Urology PA - discussed

## 2022-05-06 NOTE — DISCHARGE NOTE NURSING/CASE MANAGEMENT/SOCIAL WORK - NSDCPECAREGIVERED_GEN_ALL_CORE
carenotes on knee replacement, managing pain after surgery, d/c medications, side effects pamphlet, exercise worksheet

## 2022-05-06 NOTE — DISCHARGE NOTE NURSING/CASE MANAGEMENT/SOCIAL WORK - NSDCPEFALRISK_GEN_ALL_CORE
For information on Fall & Injury Prevention, visit: [email or web address] OR  [email or web address] OR  https://www.cdc.gov/steadi/patient.html

## 2022-05-06 NOTE — DISCHARGE NOTE PROVIDER - CARE PROVIDER_API CALL
Kenneth Abdi)  Orthopaedic Sports Medicine; Orthopaedic Surgery  [address] [place] [address]  Phone: [phone]  Fax: [phone]  Follow Up Time: 2 weeks

## 2022-05-06 NOTE — PROGRESS NOTE ADULT - ASSESSMENT
Stable  ASA for DVT prophylaxis.  PT - OOB, WBAT right lower extremity    Discharge home when cleared by PT

## 2022-05-06 NOTE — OCCUPATIONAL THERAPY INITIAL EVALUATION ADULT - NS ASR BATHING EQUIP NEEDS
Pt. educated on benefits and how to privately purchase if needed. Pt reports he owns a grab bar already./shower chair

## 2022-05-06 NOTE — PHYSICAL THERAPY INITIAL EVALUATION ADULT - ACTIVE RANGE OF MOTION EXAMINATION, REHAB EVAL
except right knee 0-80 degrees flexion/bilateral upper extremity Active ROM was WFL (within functional limits)/bilateral  lower extremity Active ROM was WFL (within functional limits)

## 2022-05-06 NOTE — DISCHARGE NOTE PROVIDER - NSDCCPTREATMENT_GEN_ALL_CORE_FT
PRINCIPAL PROCEDURE  Procedure: Right total knee arthroplasty  Findings and Treatment: 75 y/o Male presents to [facility] for orthopedic surgery. Patient s/p right total knee arthroplasty with Dr. Abdi on 5/5/2022. Patient tolerated the procedure well without any intraoperative complications. Patient tolerated physical therapy well, pain is controlled. Pt is weight bearing as tolerated. Seen by medical attending for continuity of care and management and cleared for safe discharge. Keep dressing/incision clean, dry and intact. Any suture/staples to be removed on post-op day #14 at office visit. Pt is on  Aspirin 325mg twice daily for DVT prophylaxis, please take for 6 weeks unless otherwise instructed by your surgeon. Please follow up with Dr. Abdi in 2 weeks, call office to make appointment, [phone]. Please follow up with your PMD for continuity of care and management as medications may have changed.

## 2022-05-06 NOTE — OCCUPATIONAL THERAPY INITIAL EVALUATION ADULT - MD ORDER
Occupational Therapy (OT) to evaluate and treat. Per RN, pt is okay to participate in OT evaluation and perform activity as tolerated.

## 2022-05-06 NOTE — PHYSICAL THERAPY INITIAL EVALUATION ADULT - ADDITIONAL COMMENTS
Pt. reports owning  rolling walker.     Pt. was left in bed post PT Evaluation, no apparent distress, all lines intact, call albright within reach. Arie ARANA made aware.

## 2022-05-06 NOTE — DISCHARGE NOTE NURSING/CASE MANAGEMENT/SOCIAL WORK - NSDCPNINST_GEN_ALL_CORE
You have a postop appointment with Dr Abdi on 5/23/2022 @ 11:45 AM, please keep postop dressing in place until this appointment.  Notify Dr Abdi if you experience any increase in pain not relieved with medication, any redness, drainage or swelling around incision or any fever >100.5.  Drink plenty of fluids.  Continue to elevate your leg and do exercises as instructed. Use over the counter stool softeners to assist with constipation.

## 2022-05-06 NOTE — DISCHARGE NOTE PROVIDER - NSDCMRMEDTOKEN_GEN_ALL_CORE_FT
Fish Oil oral capsule: 1 cap(s) orally once a day, last dose 4/27/22  gabapentin 600 mg oral tablet: 2 tab(s) orally 2 times a day  Multiple Vitamins oral tablet: 1 tab(s) orally once a day, last dose 4/27/22  oxyCODONE 15 mg oral tablet: 1 tab(s) orally every 4-6 hours, As Needed  vitamin b complex: 1 tab(s) orally once a day   Aspirin Enteric Coated 325 mg oral delayed release tablet: 1 tab(s) orally 2 times a day MDD:for dvt ppx  Fish Oil oral capsule: 1 cap(s) orally once a day, last dose 4/27/22  gabapentin 100 mg oral capsule: 1 cap(s) orally every 8 hours MDD:1 tab every 8 hours  MiraLax oral powder for reconstitution: 1 gram(s) orally once a day (at bedtime)   Multiple Vitamins oral tablet: 1 tab(s) orally once a day, last dose 4/27/22  oxyCODONE 5 mg oral tablet: 1 tab(s) orally every 4 hours MDD:1-2 tabs every 4 hours as needed for pain  Protonix 40 mg oral delayed release tablet: 1 tab(s) orally once a day MDD:at breakfast with food  senna oral tablet: 2 tab(s) orally once a day (at bedtime)   traMADol 50 mg oral tablet: 1 tab(s) orally every 8 hours MDD:1 tab every 8 hours for pain  vitamin b complex: 1 tab(s) orally once a day

## 2022-05-06 NOTE — OCCUPATIONAL THERAPY INITIAL EVALUATION ADULT - LIVES WITH, PROFILE
Pt. reports he lives with his wife in a house with no steps to enter. Once inside, pt. reports he has a full flight of steps to negotiate to reach 2nd floor where main bedroom and bathroom are located. Per pt., he has a bathtub in his bathroom, +grab bar.

## 2022-05-06 NOTE — DISCHARGE NOTE NURSING/CASE MANAGEMENT/SOCIAL WORK - NSDPDISTO_GEN_ALL_CORE
Home with home care Pt A&Ox4. VS stable. Pt had c/o pain, administered PRN & standing meds as ordered. OOB self/standby assist & walker. IVF initiated until d/c. Right knee dressing WDL. NVS WDL. Voids. Tolerating PO intake. Passing gas. Skin care provided. Pt turned & positioned q2hrs. Pt medicated as ordered, tolerated well. Hourly rounding performed. No distress noted. Safety maintained. Pt educated on plan of care. Will continue to monitor. Pt deemed stable for d/c. PIV removed. Pt with no c/o pain or s&s of distress at time of d/c. Pt given d/c education, pt states understanding. D/c to home./Home with home care

## 2022-05-07 LAB
ANION GAP SERPL CALC-SCNC: 10 MMOL/L — SIGNIFICANT CHANGE UP (ref 7–14)
BUN SERPL-MCNC: 29 MG/DL — HIGH (ref 7–23)
CALCIUM SERPL-MCNC: 8.7 MG/DL — SIGNIFICANT CHANGE UP (ref 8.4–10.5)
CHLORIDE SERPL-SCNC: 100 MMOL/L — SIGNIFICANT CHANGE UP (ref 98–107)
CO2 SERPL-SCNC: 27 MMOL/L — SIGNIFICANT CHANGE UP (ref 22–31)
CREAT SERPL-MCNC: 1.37 MG/DL — HIGH (ref 0.5–1.3)
EGFR: 53 ML/MIN/1.73M2 — LOW
GLUCOSE SERPL-MCNC: 95 MG/DL — SIGNIFICANT CHANGE UP (ref 70–99)
HCT VFR BLD CALC: 34.8 % — LOW (ref 39–50)
HGB BLD-MCNC: 11.6 G/DL — LOW (ref 13–17)
MCHC RBC-ENTMCNC: 29.4 PG — SIGNIFICANT CHANGE UP (ref 27–34)
MCHC RBC-ENTMCNC: 33.3 GM/DL — SIGNIFICANT CHANGE UP (ref 32–36)
MCV RBC AUTO: 88.3 FL — SIGNIFICANT CHANGE UP (ref 80–100)
NRBC # BLD: 0 /100 WBCS — SIGNIFICANT CHANGE UP
NRBC # FLD: 0 K/UL — SIGNIFICANT CHANGE UP
PLATELET # BLD AUTO: 229 K/UL — SIGNIFICANT CHANGE UP (ref 150–400)
POTASSIUM SERPL-MCNC: 4 MMOL/L — SIGNIFICANT CHANGE UP (ref 3.5–5.3)
POTASSIUM SERPL-SCNC: 4 MMOL/L — SIGNIFICANT CHANGE UP (ref 3.5–5.3)
RBC # BLD: 3.94 M/UL — LOW (ref 4.2–5.8)
RBC # FLD: 14.1 % — SIGNIFICANT CHANGE UP (ref 10.3–14.5)
SODIUM SERPL-SCNC: 137 MMOL/L — SIGNIFICANT CHANGE UP (ref 135–145)
WBC # BLD: 12.41 K/UL — HIGH (ref 3.8–10.5)
WBC # FLD AUTO: 12.41 K/UL — HIGH (ref 3.8–10.5)

## 2022-05-07 PROCEDURE — 99232 SBSQ HOSP IP/OBS MODERATE 35: CPT

## 2022-05-07 RX ORDER — POLYETHYLENE GLYCOL 3350 17 G/17G
1 POWDER, FOR SOLUTION ORAL
Qty: 14 | Refills: 0
Start: 2022-05-07 | End: 2022-05-20

## 2022-05-07 RX ORDER — GABAPENTIN 400 MG/1
2 CAPSULE ORAL
Qty: 0 | Refills: 0 | DISCHARGE

## 2022-05-07 RX ORDER — SENNA PLUS 8.6 MG/1
2 TABLET ORAL
Qty: 28 | Refills: 0
Start: 2022-05-07 | End: 2022-05-20

## 2022-05-07 RX ORDER — GABAPENTIN 400 MG/1
1 CAPSULE ORAL
Qty: 30 | Refills: 0
Start: 2022-05-07 | End: 2022-05-16

## 2022-05-07 RX ORDER — TRAMADOL HYDROCHLORIDE 50 MG/1
50 TABLET ORAL ONCE
Refills: 0 | Status: DISCONTINUED | OUTPATIENT
Start: 2022-05-07 | End: 2022-05-07

## 2022-05-07 RX ORDER — ASPIRIN/CALCIUM CARB/MAGNESIUM 324 MG
1 TABLET ORAL
Qty: 60 | Refills: 0
Start: 2022-05-07 | End: 2022-06-05

## 2022-05-07 RX ORDER — OXYCODONE HYDROCHLORIDE 5 MG/1
1 TABLET ORAL
Qty: 60 | Refills: 0
Start: 2022-05-07 | End: 2022-05-16

## 2022-05-07 RX ORDER — PANTOPRAZOLE SODIUM 20 MG/1
1 TABLET, DELAYED RELEASE ORAL
Qty: 14 | Refills: 0
Start: 2022-05-07 | End: 2022-05-20

## 2022-05-07 RX ORDER — TRAMADOL HYDROCHLORIDE 50 MG/1
1 TABLET ORAL
Qty: 30 | Refills: 0
Start: 2022-05-07 | End: 2022-05-16

## 2022-05-07 RX ORDER — OXYCODONE HYDROCHLORIDE 5 MG/1
1 TABLET ORAL
Qty: 0 | Refills: 0 | DISCHARGE

## 2022-05-07 RX ADMIN — OXYCODONE HYDROCHLORIDE 15 MILLIGRAM(S): 5 TABLET ORAL at 12:02

## 2022-05-07 RX ADMIN — GABAPENTIN 600 MILLIGRAM(S): 400 CAPSULE ORAL at 17:05

## 2022-05-07 RX ADMIN — OXYCODONE HYDROCHLORIDE 15 MILLIGRAM(S): 5 TABLET ORAL at 03:40

## 2022-05-07 RX ADMIN — OXYCODONE HYDROCHLORIDE 15 MILLIGRAM(S): 5 TABLET ORAL at 21:30

## 2022-05-07 RX ADMIN — OXYCODONE HYDROCHLORIDE 15 MILLIGRAM(S): 5 TABLET ORAL at 06:32

## 2022-05-07 RX ADMIN — GABAPENTIN 600 MILLIGRAM(S): 400 CAPSULE ORAL at 06:18

## 2022-05-07 RX ADMIN — SODIUM CHLORIDE 3 MILLILITER(S): 9 INJECTION INTRAMUSCULAR; INTRAVENOUS; SUBCUTANEOUS at 13:59

## 2022-05-07 RX ADMIN — Medication 325 MILLIGRAM(S): at 06:18

## 2022-05-07 RX ADMIN — OXYCODONE HYDROCHLORIDE 15 MILLIGRAM(S): 5 TABLET ORAL at 20:39

## 2022-05-07 RX ADMIN — TRAMADOL HYDROCHLORIDE 50 MILLIGRAM(S): 50 TABLET ORAL at 08:51

## 2022-05-07 RX ADMIN — Medication 975 MILLIGRAM(S): at 06:19

## 2022-05-07 RX ADMIN — Medication 325 MILLIGRAM(S): at 17:05

## 2022-05-07 RX ADMIN — SENNA PLUS 2 TABLET(S): 8.6 TABLET ORAL at 22:06

## 2022-05-07 RX ADMIN — SODIUM CHLORIDE 3 MILLILITER(S): 9 INJECTION INTRAMUSCULAR; INTRAVENOUS; SUBCUTANEOUS at 22:05

## 2022-05-07 RX ADMIN — SODIUM CHLORIDE 3 MILLILITER(S): 9 INJECTION INTRAMUSCULAR; INTRAVENOUS; SUBCUTANEOUS at 05:28

## 2022-05-07 RX ADMIN — OXYCODONE HYDROCHLORIDE 15 MILLIGRAM(S): 5 TABLET ORAL at 15:43

## 2022-05-07 RX ADMIN — PANTOPRAZOLE SODIUM 40 MILLIGRAM(S): 20 TABLET, DELAYED RELEASE ORAL at 06:19

## 2022-05-07 RX ADMIN — MAGNESIUM HYDROXIDE 30 MILLILITER(S): 400 TABLET, CHEWABLE ORAL at 15:44

## 2022-05-07 RX ADMIN — TRAMADOL HYDROCHLORIDE 50 MILLIGRAM(S): 50 TABLET ORAL at 09:22

## 2022-05-07 RX ADMIN — OXYCODONE HYDROCHLORIDE 15 MILLIGRAM(S): 5 TABLET ORAL at 06:18

## 2022-05-07 RX ADMIN — OXYCODONE HYDROCHLORIDE 15 MILLIGRAM(S): 5 TABLET ORAL at 11:32

## 2022-05-07 RX ADMIN — OXYCODONE HYDROCHLORIDE 15 MILLIGRAM(S): 5 TABLET ORAL at 16:13

## 2022-05-07 RX ADMIN — Medication 975 MILLIGRAM(S): at 22:07

## 2022-05-07 RX ADMIN — Medication 975 MILLIGRAM(S): at 13:05

## 2022-05-07 RX ADMIN — OXYCODONE HYDROCHLORIDE 15 MILLIGRAM(S): 5 TABLET ORAL at 02:07

## 2022-05-07 NOTE — PROGRESS NOTE ADULT - ASSESSMENT
75 y/o male osteoarthritis of the right knee now s/p right total knee replacement.     Problem/Recommendation - 1:  ·  Problem: Unilateral primary osteoarthritis, right knee.   ·  Recommendation: - s/p TKR  - pain control w/ oxy/gabapentin/protonix  - PT - rec home      Problem/Recommendation - 2:  ·  Problem: Leukocytosis.   ·  Recommendation: - reactive post po, trend. no e/o infx    dc planning    75 y/o male osteoarthritis of the right knee now s/p right total knee replacement.     Problem/Recommendation - 1:  ·  Problem: Unilateral primary osteoarthritis, right knee.   ·  Recommendation: - s/p TKR  - pain control w/ oxy/gabapentin/protonix  - PT - rec home PT     Problem/Recommendation - 2:  ·  Problem: Leukocytosis.   ·  Recommendation: - reactive post po, trend. no e/o infx    dc planning

## 2022-05-08 VITALS
TEMPERATURE: 99 F | OXYGEN SATURATION: 98 % | RESPIRATION RATE: 17 BRPM | DIASTOLIC BLOOD PRESSURE: 68 MMHG | SYSTOLIC BLOOD PRESSURE: 132 MMHG | HEART RATE: 90 BPM

## 2022-05-08 PROCEDURE — 99232 SBSQ HOSP IP/OBS MODERATE 35: CPT

## 2022-05-08 RX ORDER — SODIUM CHLORIDE 9 MG/ML
1000 INJECTION, SOLUTION INTRAVENOUS
Refills: 0 | Status: DISCONTINUED | OUTPATIENT
Start: 2022-05-08 | End: 2022-05-08

## 2022-05-08 RX ADMIN — PANTOPRAZOLE SODIUM 40 MILLIGRAM(S): 20 TABLET, DELAYED RELEASE ORAL at 05:14

## 2022-05-08 RX ADMIN — GABAPENTIN 600 MILLIGRAM(S): 400 CAPSULE ORAL at 05:14

## 2022-05-08 RX ADMIN — OXYCODONE HYDROCHLORIDE 15 MILLIGRAM(S): 5 TABLET ORAL at 00:46

## 2022-05-08 RX ADMIN — Medication 325 MILLIGRAM(S): at 05:13

## 2022-05-08 RX ADMIN — SODIUM CHLORIDE 3 MILLILITER(S): 9 INJECTION INTRAMUSCULAR; INTRAVENOUS; SUBCUTANEOUS at 07:04

## 2022-05-08 RX ADMIN — OXYCODONE HYDROCHLORIDE 15 MILLIGRAM(S): 5 TABLET ORAL at 07:29

## 2022-05-08 RX ADMIN — OXYCODONE HYDROCHLORIDE 15 MILLIGRAM(S): 5 TABLET ORAL at 13:15

## 2022-05-08 RX ADMIN — Medication 975 MILLIGRAM(S): at 05:14

## 2022-05-08 RX ADMIN — OXYCODONE HYDROCHLORIDE 15 MILLIGRAM(S): 5 TABLET ORAL at 01:50

## 2022-05-08 RX ADMIN — OXYCODONE HYDROCHLORIDE 15 MILLIGRAM(S): 5 TABLET ORAL at 08:29

## 2022-05-08 RX ADMIN — OXYCODONE HYDROCHLORIDE 15 MILLIGRAM(S): 5 TABLET ORAL at 12:16

## 2022-05-08 RX ADMIN — SODIUM CHLORIDE 75 MILLILITER(S): 9 INJECTION, SOLUTION INTRAVENOUS at 09:43

## 2022-05-08 NOTE — PROGRESS NOTE ADULT - ASSESSMENT
77 y/o male osteoarthritis of the right knee now s/p right total knee replacement.     Problem/Recommendation - 1:  ·  Problem: Unilateral primary osteoarthritis, right knee.   ·  Recommendation: - s/p TKR  - pain control w/ oxy/gabapentin/protonix  - PT - rec home PT     Problem/Recommendation - 2:  ·  Problem: Leukocytosis.   ·  Recommendation: - reactive post po, trend. no e/o infx    mild Clarissa this morning - likely hemodynamic mediated - IVF while here but shouldn't hold discharge as riky po well     dc planning

## 2022-05-08 NOTE — PROGRESS NOTE ADULT - SUBJECTIVE AND OBJECTIVE BOX
ORTHO POC      Patient resting comfortably without complaint s/p right TKA today     Vital Signs Last 24 Hrs  T(C): 36.6 (05 May 2022 15:50), Max: 36.6 (05 May 2022 15:50)  T(F): 97.9 (05 May 2022 15:50), Max: 97.9 (05 May 2022 15:50)  HR: 81 (05 May 2022 16:30) (69 - 85)  BP: 134/88 (05 May 2022 16:30) (134/88 - 148/91)  BP(mean): 99 (05 May 2022 16:30) (99 - 106)  RR: 15 (05 May 2022 16:30) (15 - 20)  SpO2: 94% (05 May 2022 16:30) (94% - 96%)    PE : dressing clean/dry/ intact            LE:  EHL/GC/TA 5/5                  sensory intact                   DP pulse 2+    Labs : Pending                       U/O:  DTV    A/P: Stable postop            PT- WBAT            DVT prophylaxis- venodynes/ ASA BID           Pain Control            Incentive Spirometer            Antibiotics x 24 hr            Follow up AM labs
Orthopaedic Surgery Progress Note    Subjective:   Patient seen and examined. No acute events overnight. Ambulating around room with walker.     Objective:  T(C): 36.8 (05-07-22 @ 06:15), Max: 37.1 (05-06-22 @ 17:59)  HR: 79 (05-07-22 @ 06:15) (66 - 79)  BP: 120/70 (05-07-22 @ 06:15) (99/63 - 130/63)  RR: 17 (05-07-22 @ 06:15) (17 - 18)  SpO2: 96% (05-07-22 @ 06:15) (94% - 97%)  Wt(kg): --    05-06 @ 07:01  -  05-07 @ 07:00  --------------------------------------------------------  IN: 0 mL / OUT: 1600 mL / NET: -1600 mL        PE    NAD  RLE:   two small areas of strikethough (stable) otherwise dressing C/D/I  motor intact GS/TA/EHL  SILT S/S/SP/DP  WWP                          11.6   12.41 )-----------( 229      ( 07 May 2022 07:44 )             34.8         76y Male s/p R TKA  - Pain control  - WBAT  - PT/OT/OOB  - DVT ppx:  BID  - Dispo planning: home
POD 1 s/p Right TKR  A&O x3  Right knee bandage clean and dry.  No calf tenderness.  DNVI right foot and ankle.    
ORTHOPAEDICS DAILY PROGRESS NOTE:       SUBJECTIVE/ROS: POD 3. Seen and examined. Pain well controlled. Has been working with Pt. Denies CP/SOB/N/V. Still needs to work with PT on stairs         MEDICATIONS  (STANDING):  acetaminophen     Tablet .. 975 milliGRAM(s) Oral every 8 hours  aspirin enteric coated 325 milliGRAM(s) Oral two times a day  gabapentin 600 milliGRAM(s) Oral two times a day  HYDROmorphone  Injectable 0.5 milliGRAM(s) IV Push once  lactated ringers. 1000 milliLiter(s) (150 mL/Hr) IV Continuous <Continuous>  lactated ringers. 1000 milliLiter(s) (30 mL/Hr) IV Continuous <Continuous>  pantoprazole    Tablet 40 milliGRAM(s) Oral before breakfast  pantoprazole    Tablet 40 milliGRAM(s) Oral before breakfast  senna 2 Tablet(s) Oral at bedtime  sodium chloride 0.9% lock flush 3 milliLiter(s) IV Push every 8 hours    MEDICATIONS  (PRN):  magnesium hydroxide Suspension 30 milliLiter(s) Oral daily PRN Constipation  ondansetron Injectable 4 milliGRAM(s) IV Push every 6 hours PRN Nausea and/or Vomiting  oxyCODONE    IR 15 milliGRAM(s) Oral every 4 hours PRN Severe Pain (7 - 10)  oxyCODONE    IR 10 milliGRAM(s) Oral every 4 hours PRN Moderate Pain (4 - 6)  traMADol 50 milliGRAM(s) Oral every 6 hours PRN Mild Pain (1 - 3)      OBJECTIVE:    Vital Signs Last 24 Hrs  T(C): 36.7 (07 May 2022 22:00), Max: 36.9 (07 May 2022 17:56)  T(F): 98.1 (07 May 2022 22:00), Max: 98.4 (07 May 2022 17:56)  HR: 86 (07 May 2022 22:00) (70 - 86)  BP: 113/69 (07 May 2022 22:00) (113/69 - 129/69)  BP(mean): --  RR: 17 (07 May 2022 22:00) (17 - 19)  SpO2: 94% (07 May 2022 22:00) (93% - 96%)    I&O's Detail    06 May 2022 07:01  -  07 May 2022 07:00  --------------------------------------------------------  IN:  Total IN: 0 mL    OUT:    Voided (mL): 1600 mL  Total OUT: 1600 mL    Total NET: -1600 mL      07 May 2022 07:01  -  08 May 2022 00:32  --------------------------------------------------------  IN:  Total IN: 0 mL    OUT:    Voided (mL): 800 mL  Total OUT: 800 mL    Total NET: -800 mL          Daily     Daily     LABS:                        11.6   12.41 )-----------( 229      ( 07 May 2022 07:44 )             34.8     05-07    137  |  100  |  29<H>  ----------------------------<  95  4.0   |  27  |  1.37<H>    Ca    8.7      07 May 2022 07:44                    PHYSICAL EXAM:  nad  nonlabored resp  RLE  dressing c/d/i  +gastroc/ta/ehl/fhl  silt s/s/sp/dp/t  +dp  
Patient is a 76y old  Male who presents with a chief complaint of right total knee arthroplasty  (06 May 2022 19:00)      SUBJECTIVE / OVERNIGHT EVENTS: had BM today - eating and drinking well     ROS:  No HA/DZ  No Vision changes   No CP, SOB  No N/V/D  No Edema  No Rash  NO weakness, numbness    MEDICATIONS  (STANDING):  acetaminophen     Tablet .. 975 milliGRAM(s) Oral every 8 hours  aspirin enteric coated 325 milliGRAM(s) Oral two times a day  gabapentin 600 milliGRAM(s) Oral two times a day  HYDROmorphone  Injectable 0.5 milliGRAM(s) IV Push once  lactated ringers. 1000 milliLiter(s) (75 mL/Hr) IV Continuous <Continuous>  pantoprazole    Tablet 40 milliGRAM(s) Oral before breakfast  pantoprazole    Tablet 40 milliGRAM(s) Oral before breakfast  senna 2 Tablet(s) Oral at bedtime  sodium chloride 0.9% lock flush 3 milliLiter(s) IV Push every 8 hours    MEDICATIONS  (PRN):  magnesium hydroxide Suspension 30 milliLiter(s) Oral daily PRN Constipation  ondansetron Injectable 4 milliGRAM(s) IV Push every 6 hours PRN Nausea and/or Vomiting  oxyCODONE    IR 15 milliGRAM(s) Oral every 4 hours PRN Severe Pain (7 - 10)  oxyCODONE    IR 10 milliGRAM(s) Oral every 4 hours PRN Moderate Pain (4 - 6)  traMADol 50 milliGRAM(s) Oral every 6 hours PRN Mild Pain (1 - 3)      T(C): 37.1 (05-08-22 @ 09:29)  HR: 90 (05-08-22 @ 09:29)  BP: 132/68 (05-08-22 @ 09:29)  RR: 17 (05-08-22 @ 09:29)  SpO2: 98% (05-08-22 @ 09:29)  CAPILLARY BLOOD GLUCOSE        I&O's Summary    07 May 2022 07:01  -  08 May 2022 07:00  --------------------------------------------------------  IN: 0 mL / OUT: 1450 mL / NET: -1450 mL        PHYSICAL EXAM:  GENERAL: NAD, well-developed, AOx3  HEAD:  Atraumatic, Normocephalic  EYES: EOMI, PERRL, conjunctiva and sclera clear  NECK: Supple, No JVD  CHEST/LUNG: Clear to auscultation bilaterally  HEART: Regular rate and rhythm; No murmurs, rubs, or gallops, No Edema  ABDOMEN: Soft, Nontender, Nondistended; Bowel sounds present  EXTREMITIES:  2+ Peripheral Pulses, No clubbing, cyanosis  PSYCH: No SI/HI  NEUROLOGY: non-focal  SKIN: dressing c.d.i   LABS:                        11.6   12.41 )-----------( 229      ( 07 May 2022 07:44 )             34.8     05-07    137  |  100  |  29<H>  ----------------------------<  95  4.0   |  27  |  1.37<H>    Ca    8.7      07 May 2022 07:44                    RADIOLOGY & ADDITIONAL TESTS:    Imaging Personally Reviewed:    Consultant(s) Notes Reviewed:      Care Discussed with Consultants/Other Providers:  
Patient is a 76y old  Male who presents with a chief complaint of right total knee arthroplasty  (06 May 2022 19:00)      SUBJECTIVE / OVERNIGHT EVENTS: no events     ROS:  No HA/DZ  No Vision changes   No CP, SOB  No N/V/D  No Edema  No Rash  NO weakness, numbness    MEDICATIONS  (STANDING):  acetaminophen     Tablet .. 975 milliGRAM(s) Oral every 8 hours  aspirin enteric coated 325 milliGRAM(s) Oral two times a day  gabapentin 600 milliGRAM(s) Oral two times a day  HYDROmorphone  Injectable 0.5 milliGRAM(s) IV Push once  lactated ringers. 1000 milliLiter(s) (150 mL/Hr) IV Continuous <Continuous>  lactated ringers. 1000 milliLiter(s) (30 mL/Hr) IV Continuous <Continuous>  pantoprazole    Tablet 40 milliGRAM(s) Oral before breakfast  pantoprazole    Tablet 40 milliGRAM(s) Oral before breakfast  senna 2 Tablet(s) Oral at bedtime  sodium chloride 0.9% lock flush 3 milliLiter(s) IV Push every 8 hours    MEDICATIONS  (PRN):  magnesium hydroxide Suspension 30 milliLiter(s) Oral daily PRN Constipation  ondansetron Injectable 4 milliGRAM(s) IV Push every 6 hours PRN Nausea and/or Vomiting  oxyCODONE    IR 15 milliGRAM(s) Oral every 4 hours PRN Severe Pain (7 - 10)  oxyCODONE    IR 10 milliGRAM(s) Oral every 4 hours PRN Moderate Pain (4 - 6)  traMADol 50 milliGRAM(s) Oral every 6 hours PRN Mild Pain (1 - 3)      T(C): 36.8 (05-07-22 @ 06:15)  HR: 79 (05-07-22 @ 06:15)  BP: 120/70 (05-07-22 @ 06:15)  RR: 17 (05-07-22 @ 06:15)  SpO2: 96% (05-07-22 @ 06:15)  CAPILLARY BLOOD GLUCOSE        I&O's Summary    06 May 2022 07:01  -  07 May 2022 07:00  --------------------------------------------------------  IN: 0 mL / OUT: 1600 mL / NET: -1600 mL        PHYSICAL EXAM:  GENERAL: NAD, well-developed, AOx3  HEAD:  Atraumatic, Normocephalic  EYES: EOMI, PERRL, conjunctiva and sclera clear  NECK: Supple, No JVD  CHEST/LUNG: Clear to auscultation bilaterally  HEART: Regular rate and rhythm; No murmurs, rubs, or gallops, No Edema  ABDOMEN: Soft, Nontender, Nondistended; Bowel sounds present  EXTREMITIES:  2+ Peripheral Pulses, No clubbing, cyanosis  PSYCH: No SI/HI  NEUROLOGY: non-focal  SKIN: dressing c.d.i   LABS:                        11.6   12.41 )-----------( 229      ( 07 May 2022 07:44 )             34.8     05-06    135  |  101  |  20  ----------------------------<  141<H>  4.5   |  22  |  1.23    Ca    8.4      06 May 2022 07:23                    RADIOLOGY & ADDITIONAL TESTS:    Imaging Personally Reviewed:    Consultant(s) Notes Reviewed:      Care Discussed with Consultants/Other Providers:

## 2022-05-16 LAB — SURGICAL PATHOLOGY STUDY: SIGNIFICANT CHANGE UP

## 2022-05-23 ENCOUNTER — APPOINTMENT (OUTPATIENT)
Dept: ORTHOPEDIC SURGERY | Facility: CLINIC | Age: 77
End: 2022-05-23
Payer: MEDICARE

## 2022-05-23 PROBLEM — Z86.19 PERSONAL HISTORY OF OTHER INFECTIOUS AND PARASITIC DISEASES: Chronic | Status: ACTIVE | Noted: 2022-04-19

## 2022-05-23 PROBLEM — M17.11 UNILATERAL PRIMARY OSTEOARTHRITIS, RIGHT KNEE: Chronic | Status: ACTIVE | Noted: 2022-04-19

## 2022-05-23 PROCEDURE — 99024 POSTOP FOLLOW-UP VISIT: CPT

## 2022-05-26 NOTE — H&P PST ADULT - NEGATIVE CARDIOVASCULAR SYMPTOMS
no chest pain/no palpitations/no dyspnea on exertion/no peripheral edema - DASH diet   - continue hctz 25mg QD and lisinopril 20mg QD - DASH diet   - continue hctz 25mg QD and lisinopril 20mg QD w/ hold parameters - hx of MS  - wheelchair bound, incontinent   - pt receives Plegridy q 2 weeks - next dose 05/30   - f/u neuro outpatient    Daughter said patient's  will bring in medication on 5/30 as it needs to stay refrigerated until administration. Family aware next dose due on 5/30. - hx of MS  - wheelchair bound, incontinent   - pt receives Plegridy q 2 weeks - next dose 05/30   - f/u neuro outpatient for same    Daughter said patient's  will bring in medication on 5/30 as it needs to stay refrigerated until administration. Family aware next dose due on 5/30.

## 2022-07-18 ENCOUNTER — APPOINTMENT (OUTPATIENT)
Dept: ORTHOPEDIC SURGERY | Facility: CLINIC | Age: 77
End: 2022-07-18

## 2022-07-18 DIAGNOSIS — Z96.651 PRESENCE OF RIGHT ARTIFICIAL KNEE JOINT: ICD-10-CM

## 2022-07-18 PROCEDURE — 99024 POSTOP FOLLOW-UP VISIT: CPT

## 2022-07-18 PROCEDURE — 73562 X-RAY EXAM OF KNEE 3: CPT | Mod: RT

## 2022-10-24 ENCOUNTER — APPOINTMENT (OUTPATIENT)
Dept: ORTHOPEDIC SURGERY | Facility: CLINIC | Age: 77
End: 2022-10-24

## 2022-10-24 VITALS
SYSTOLIC BLOOD PRESSURE: 180 MMHG | HEART RATE: 93 BPM | HEIGHT: 67 IN | WEIGHT: 227 LBS | DIASTOLIC BLOOD PRESSURE: 93 MMHG | BODY MASS INDEX: 35.63 KG/M2

## 2022-10-24 DIAGNOSIS — M25.561 PAIN IN RIGHT KNEE: ICD-10-CM

## 2022-10-24 DIAGNOSIS — M25.669 OTHER SPECIFIED COMPLICATION OF INTERNAL ORTHOPEDIC PROSTHETIC DEVICES, IMPLANTS AND GRAFTS, SUBSEQUENT ENCOUNTER: ICD-10-CM

## 2022-10-24 DIAGNOSIS — T84.89XD OTHER SPECIFIED COMPLICATION OF INTERNAL ORTHOPEDIC PROSTHETIC DEVICES, IMPLANTS AND GRAFTS, SUBSEQUENT ENCOUNTER: ICD-10-CM

## 2022-10-24 DIAGNOSIS — Z96.659 OTHER SPECIFIED COMPLICATION OF INTERNAL ORTHOPEDIC PROSTHETIC DEVICES, IMPLANTS AND GRAFTS, SUBSEQUENT ENCOUNTER: ICD-10-CM

## 2022-10-24 PROCEDURE — 73562 X-RAY EXAM OF KNEE 3: CPT | Mod: RT

## 2022-10-24 PROCEDURE — 99213 OFFICE O/P EST LOW 20 MIN: CPT

## 2023-01-16 NOTE — ASU PREOP CHECKLIST - LATEX ALLERGY
Problem: Mechanical Ventilation Invasive  Goal: Effective Communication  Intervention: Ensure Effective Communication  Recent Flowsheet Documentation  Taken 1/15/2023 1600 by Mel Ayala RN  Family/Support System Care: support provided  Trust Relationship/Rapport:   care explained   choices provided     Goal Outcome Evaluation:    Pt slept during shift pt changed position independently.  Pt sat in recliner chair during meal time.  Pt did request snack at HS of cheese and crackers.   Pt walked to bathroom independently voiding and returning to chair without difficulty. Pt requested prn Ambien with HS medications.                           no

## 2023-03-25 NOTE — H&P PST ADULT - NSALCOHOLAMT_GEN_A_CORE_SD
HEMATOLOGY/ONCOLOGY PROGRESS NOTE      Diagnosis: Acute myeloid leukemia    History Of Present Illness  Sydnie is a 65 year old female who was transferred from [facility] for higher level care and management of newly diagnosed acute myeloid leukemia.  She presented to the hospital initially on 2/22/23 with feeling weak, dizziness, and diarrhea x5 days.  At time of admission she was noted to be febrile 100.9 and initial CBC showed pancytopenia with abnormal cells (blasts).  Peripheral blood flow showed 68% myeloid blasts (CD34+, HLADR+, +, MPO+, CD13+).  Peripheral FISH 15;17 negative; FLT3 ITD positive (low allelic burden <0.5).  She underwent bone marrow biopsy on 2/23/2023 that showed 60-70% blasts, consistent with involvement of acute myeloid leukemia.  Cytogenetics and NGS panel pending.  She was treated with empiric course of IV Zosyn x4 days.  Fevers resolved and infectious workup negative.  She was transferred to [facility] on 2/28/23.   This morning she reports feeling well.  She denies any complaints.  She feels overall better than when she initially presented to the hospital.  She reports very good functional status prior to presentation.  She lives independently and works as a .  She has no past medical history.  We discussed diagnosis of AML today.  We discussed potential induction therapy, pending cytogenetic studies.  We discussed plan for ECHO and PICC line today.    3/2/2023: NO acute events.  PICC line placed and ECHO completed.  Cytogenetics signed out as normal.  She feels well today and denies any complaints.  We discussed induction chemotherapy with cytarabine, daunorubicin, and midostaurin.  Risks/benefits and anticipated toxicity of therapy was discussed and all questions were answered.  She verbalized understanding and consent was signed.    3/3/2023: No acute events.  Initiated chemotherapy yesterday afternoon.  Says she feels overall well today.  She denies any  nausea/vomiting.  She reports overall good appetite.  She does have increased urination, no burning, likely due to IVF.  She is ambulating around room without issues.    3/4/2023: Tolerating chemotherapy well. Denies nausea or vomiting. Eating and drinking okay. Denies constipation. Able to ambulate the hallway yesterday with no difficulty.     3/5/2023: No specific complaints. Ambulating hallway twice daily.     3/6/2023: Day 5 of 7+3 and midostaurin.  Feels well overall.  Denies any nausea/vomiting, diarrhea.  Ambulating well. Appetite is good.    3/7/2023: Day 6.  Feels well overall.  Does note increased bilateral LE and LUE swelling.  She had small drops of blood with BM yesterday she believes is secondary to her hemorrhoids.  She had some heartburn this morning.  No fevers/chills, nausea/vomiting.    3/8/2023: Day 7.  Feels well this morning.  LUE swelling improved.  She had good urine output after lasix yesterday.  No further bleeding.  Eating well.    3/9/2023: Day 8.  NO acute events.  Remains afebrile.  No BM x2 days but does not feel constipated.  Good urine output again with lasix.  Arm swelling improved.    3/10/2023: Day 9.  Febrile 38.7 yesterday.  Infectious workup sent - negative thus far.  Started on Cefepime.  She feels overall well, no changes.  She has not had BM in 3 days and took laxative this morning.  She denies any nausea/vomiting.  Swelling has overall improved.    3/11/2023: Day 10.  Afebrile overnight.  Remains on Cefepime.  Had BM yesterday, no diarrhea, no blood.  Ambulating well, appetite is good today.  Denies any nausea/vomiting.  She reports improvement in LUE swelling.    3/12/2023: Day 11.  Remains afebrile.  Normal BM.  No nausea/vomiting.  Having some fatigue.  LE swelling improved.    3/13/2023: Day 12.  No acute events.  Remains afebrile.  No nausea/vomiting.  No bleeding.  LE swelling improved.  CBC stable - no transfusion requirement today.    3/14/2023: Day 13, NO acute  events.  Reports constipation and hemorrhoid irritation.  No bleeding.  No abdominal pain.  Remains afebrile.  Requiring blood transfusion today.  Eating well, ambulating.    3/15/2023: Day 14.  Low grade fever last night, was not sustained on repeat temp check.  However infectious workup was done and Cefepime was changed to Zosyn for better abdominal coverage (anaerobic).  She feels well this morning other than continued constipation.  She denies any abdominal pain or discomfort.  She denies any bleeding.  Needs platelet transfusion today.    3/16/2023: Day 15.  No acute events.  No fevers.  Had multiple bowel movements yesterday and today.  Denies any bleeding.  Feels tired but otherwise well.      3/17/2023: Day 16.  No acute events.  No fevers.  Had loose bowel movement this morning.  Denies any bleeding.  Eating well. Did not walk yesterday but will today.    3/18/2023: Day 17 chemotherapy today. Only complaint today is hemorrhoidal pain. Severe rectal pain with movement/ambulation. Bowel movements are soft -- 1-2 daily. Rectal site inspected this morning -- noted inflammed external hemorrhoids, mild erythema, no obvious evidence of sores/abscess, and no open sores. Remains afebrile. No bleeding. Ambulating in room. Tolerating PO.     3/19/2023: Day 18 chemotherapy today. Hemorrhoid pain is the same. Soft bowel movement today. Wants to keep Dulcolax on board, as she is very concerned about becoming constipated again. No diarrhea. Tolerating PO. Ambulating with some difficulty due to hemorrhoid pain. No bleeding. To receive 1 unit pRBC and 1 unit platelets today.    3/20/2023: Day 19.  Febrile overnight, initiated on Zosyn. Infectious workup sent, negative thus far.  She feels tired and has some pain from hemorrhoids.  She otherwise notes fair appetite.  No diarrhea.    3/21/2023: Day 20.  Persistent fevers yesterday (Tmax 102.7) - seen by ID and changed to Meropenem and Vancomycin. CT C/A/P ordered to evaluate  perirectal inflammation r/o abscess.  Afebrile since midnight, vitals stable.    She feels overall better today, less tired..  She continues to have perirectal pain, unchanged.  Feels like pressure and worst with movement.  No bleeding.    3/22/2023: Day 21.  No fevers.  Continued rectal pain and pressure.  Episode of blood in stool yesterday afternoon/evening, received 1 unit plt and 1 unit blood.  No further bleeding this morning.  Plan for bone marrow biopsy today.    3/23/2023: Day 22.  No acute events.  No fevers/chills.  Still having some blood in stool, mainly streaks she believes is related to hemorrhoids.  Pain is still present but slightly improved.  CBC shows marked improvement in WBC/ANC and platelet count today indicating count recovery.  Bone marrow biopsy results are pending.    3/24/2023: Day 23.  No acute events.  Remains afebrile.  Seen by colorectal surgery yesterday, plan for exam under anesthesia and possible abscess drainage today.  Remains on IV antibiotics.  ANC >1000, PLT >100 this morning.  Bone marrow results pending.    3/25/23 Drainage of rectal abscess done yesterday. Feeling well and afebrile today. Blood counts rising nicely. Platelet count is now normal. No complaints.     Past Medical History  Past Medical History:   Diagnosis Date   • Alopecia    • Rosacea         Surgical History  Past Surgical History:   Procedure Laterality Date   •  delivery only     • Cholecystectomy     • Dilation and curettage     • Hernia repair     • Hysterectomy         Medications  Medications Prior to Admission   Medication Sig Dispense Refill   • Multiple Minerals (Calcium-Magnesium-Zinc) Tab Take 1 tablet by mouth 3 days a week.     • minoxidil (LONITEN) 2.5 MG tablet Take 2.5 mg by mouth 3 days a week. MWF     • fluticasone (FLONASE) 50 MCG/ACT nasal spray Spray 2 sprays in each nostril daily. (Patient taking differently: Spray 2 sprays in each nostril daily as needed.) 16 g 12   • VITAMIN D,  CHOLECALCIFEROL, PO Take 1 capsule by mouth 3 days a week.     • desonide (DESOWEN) 0.05 % cream Apply topically 2 times daily. APPLY TO THE AFFECTED AREA AS DIRECTED (Patient taking differently: Apply 1 application. topically daily as needed. APPLY TO THE AFFECTED AREA AS DIRECTED) 120 g 3   • Ferrous Sulfate (IRON) 325 (65 Fe) MG Tab Take 1 tablet by mouth 1 day a week.         Review of Systems  10 systems reviewed, negative except noted in HPI     Last Recorded Vitals  Blood pressure 138/76, pulse 67, temperature 97.7 °F (36.5 °C), temperature source Oral, resp. rate 18, height 5' 9\" (1.753 m), weight 110.7 kg (244 lb 0.8 oz), SpO2 97 %.    Physical Exam  ECO  General:  Alert and oriented. No acute distress.  Head:  Normocephalic, without obvious abnormality.  Eyes:  Conjunctiva clear, No icterus.   Neck: No enlarged cervical, supraclavicular lymphadenopathy.  Lungs:   Clear to ascultation; symmetrical breath sounds.   Heart:  Regular rate and rhythm. S1 and S2 normal. No murmur, rub or gallop.  Abdomen:  Soft, non-tender, bowel sounds normal.  No hepatomegaly.  No splenomegaly.  Extremities:   Trace bilateral LE edema, LUE mild edema resolved  Lymph Nodes:  Cervical, supraclavicular or axillary nodes normal.  Musculoskeletal: Symmetrical strength; no focal weakness  Skin:  No ecchymosis.  No rash.   Neurologic:  Alert and oriented x 3.  Normal affect.  No focal motor defects.       Labs     WBC (K/mcL)   Date Value   2023 2.4 (L)     RBC (mil/mcL)   Date Value   2023 3.39 (L)     HCT (%)   Date Value   2023 29.7 (L)     HGB (g/dL)   Date Value   2023 9.9 (L)     PLT (K/mcL)   Date Value   2023 231     GOT/AST (Units/L)   Date Value   2023 50 (H)     GPT/ALT (Units/L)   Date Value   2023 90 (H)     No results found for: GGTP  Alkaline Phosphatase (Units/L)   Date Value   2023 88     Bilirubin, Total (mg/dL)   Date Value   2023 0.5     Sodium (mmol/L)    Date Value   03/25/2023 138     Potassium (mmol/L)   Date Value   03/25/2023 3.6     Chloride (mmol/L)   Date Value   03/25/2023 104     Carbon Dioxide (mmol/L)   Date Value   03/25/2023 31     BUN (mg/dL)   Date Value   03/25/2023 12     Creatinine (mg/dL)   Date Value   03/25/2023 0.65         ASSESSMENT/PLAN:  64 y/o female who presents for:    Acute myeloid leukemia 2/24/2023  FLT3 ITD positive (low allelic burden) from peripheral blood, cytogenetics normal - Intermediate risk disease.  NGS Panel +FLT3 ITD (0.38), DNMT3A, IDH1, BCOR.   ECHO normal EF  PICC Line placed  No evidence of TLS - allopurinol 300mg daily + IVF; monitor daily   No evidence of DIC - monitor labs daily  Proph Levaquin, Valacyclovir, Posaconazole  Transfuse to maintain Hgb >7 and PLT >10  She is recovering very well and will be ready to have new bone marrow biopsy to assess remission status next week before discharge (ANC>1K/mcl, platelets >100K)    Cytarabine + Daunorubicin + Midostaurin - Day 24 today.  Overall tolerating well.  No signs or symptoms of TLS.  EKG at 48hrs to monitor QTc stable, repeat at 1 week stable.   Discontinue Midostaurin today - completed 14 days.  Bone marrow biopsy at day 21 completed 3/22/23 - results pending    Chemotherapy induced anemia/thrombocytopenia - transfuse to maintain hgb >7 and PLT >10 - no further transfusion needs    Neutropenic fever (3/9/2023)  U/a negative, CXR normal, Blood culture NGTD  Meropenem + Vancomycin -> ID following  CT C/A/P 3/21 - perianal fistula, ?superimposed abscess - likely source of previus fevers - It was drained on 3/24/23 and she is now doing very well.     Hemorrhoid pain  - Better after abscess drainage ln 3/24/23  - Anusol, lidocaine gel TOPICAL only   - Sitz baths  - Stool softener to continue    Constipation  Resolved  Stool softener     Fluid overload - improved.  IVF discontinued. Lasix prn.    LUE swelling - LUE doppler with thrombophlebitis.  Resolved    GERD -  PPI at bedtime, tums prn    PT/OT to evaluate/treat    To continue lovenox 40mg sq daily   Zhou Anton MD PhD               1-2 drinks

## 2023-04-10 ENCOUNTER — APPOINTMENT (OUTPATIENT)
Dept: ORTHOPEDIC SURGERY | Facility: CLINIC | Age: 78
End: 2023-04-10
Payer: MEDICARE

## 2023-04-10 VITALS
BODY MASS INDEX: 35.68 KG/M2 | HEIGHT: 67.5 IN | SYSTOLIC BLOOD PRESSURE: 164 MMHG | DIASTOLIC BLOOD PRESSURE: 94 MMHG | HEART RATE: 74 BPM | WEIGHT: 230 LBS

## 2023-04-10 PROCEDURE — 73562 X-RAY EXAM OF KNEE 3: CPT | Mod: RT

## 2023-04-10 PROCEDURE — 99213 OFFICE O/P EST LOW 20 MIN: CPT

## 2023-04-20 ENCOUNTER — APPOINTMENT (OUTPATIENT)
Dept: GASTROENTEROLOGY | Facility: CLINIC | Age: 78
End: 2023-04-20

## 2023-06-29 NOTE — DISCHARGE NOTE NURSING/CASE MANAGEMENT/SOCIAL WORK - NSCORESITESY/N_GEN_A_CORE_RD
No Quality 226: Preventive Care And Screening: Tobacco Use: Screening And Cessation Intervention: Patient screened for tobacco use and is an ex/non-smoker Quality 130: Documentation Of Current Medications In The Medical Record: Current Medications Documented Detail Level: Detailed Quality 431: Preventive Care And Screening: Unhealthy Alcohol Use - Screening: Patient screened for unhealthy alcohol use using a single question and scores less than 2 times per year

## 2023-08-24 ENCOUNTER — NON-APPOINTMENT (OUTPATIENT)
Age: 78
End: 2023-08-24

## 2023-08-24 ENCOUNTER — APPOINTMENT (OUTPATIENT)
Dept: GASTROENTEROLOGY | Facility: CLINIC | Age: 78
End: 2023-08-24
Payer: MEDICARE

## 2023-08-24 VITALS
SYSTOLIC BLOOD PRESSURE: 129 MMHG | DIASTOLIC BLOOD PRESSURE: 81 MMHG | HEART RATE: 81 BPM | TEMPERATURE: 97.2 F | HEIGHT: 67.5 IN | OXYGEN SATURATION: 92 % | WEIGHT: 235 LBS | BODY MASS INDEX: 36.45 KG/M2

## 2023-08-24 PROCEDURE — 99203 OFFICE O/P NEW LOW 30 MIN: CPT

## 2023-08-24 RX ORDER — SODIUM SULFATE, POTASSIUM SULFATE AND MAGNESIUM SULFATE 1.6; 3.13; 17.5 G/177ML; G/177ML; G/177ML
17.5-3.13-1.6 SOLUTION ORAL
Qty: 1 | Refills: 0 | Status: ACTIVE | COMMUNITY
Start: 2018-05-31 | End: 1900-01-01

## 2023-08-24 NOTE — HISTORY OF PRESENT ILLNESS
[FreeTextEntry1] : 77-year-old male history of colon polyps due for surveillance Last examination 5 years ago No significant interval complaints Regular bowel habit without bleeding Denies any reflux or difficulty swallowing  Denies history of coronary disease congestive heart failure dysrhythmia or diabetes  Social history: Retired , golf course , wife recovering from major back surgery

## 2023-08-24 NOTE — ASSESSMENT
[FreeTextEntry1] :  History of polyps due for surveillance  Plan Indications risks benefits and alternatives to colonoscopy reviewed.  Patient agreeable.

## 2023-10-16 ENCOUNTER — APPOINTMENT (OUTPATIENT)
Dept: ORTHOPEDIC SURGERY | Facility: CLINIC | Age: 78
End: 2023-10-16
Payer: MEDICARE

## 2023-10-16 VITALS
HEART RATE: 81 BPM | DIASTOLIC BLOOD PRESSURE: 100 MMHG | SYSTOLIC BLOOD PRESSURE: 161 MMHG | HEIGHT: 67 IN | BODY MASS INDEX: 37.35 KG/M2 | WEIGHT: 238 LBS

## 2023-10-16 DIAGNOSIS — M25.661 STIFFNESS OF RIGHT KNEE, NOT ELSEWHERE CLASSIFIED: ICD-10-CM

## 2023-10-16 DIAGNOSIS — Z96.651 PRESENCE OF RIGHT ARTIFICIAL KNEE JOINT: ICD-10-CM

## 2023-10-16 PROCEDURE — 99213 OFFICE O/P EST LOW 20 MIN: CPT

## 2023-11-13 ENCOUNTER — TRANSCRIPTION ENCOUNTER (OUTPATIENT)
Age: 78
End: 2023-11-13

## 2023-11-13 ENCOUNTER — OUTPATIENT (OUTPATIENT)
Dept: OUTPATIENT SERVICES | Facility: HOSPITAL | Age: 78
LOS: 1 days | End: 2023-11-13
Payer: MEDICARE

## 2023-11-13 ENCOUNTER — RESULT REVIEW (OUTPATIENT)
Age: 78
End: 2023-11-13

## 2023-11-13 ENCOUNTER — APPOINTMENT (OUTPATIENT)
Dept: GASTROENTEROLOGY | Facility: HOSPITAL | Age: 78
End: 2023-11-13

## 2023-11-13 VITALS
OXYGEN SATURATION: 97 % | RESPIRATION RATE: 16 BRPM | SYSTOLIC BLOOD PRESSURE: 100 MMHG | HEART RATE: 66 BPM | DIASTOLIC BLOOD PRESSURE: 60 MMHG

## 2023-11-13 VITALS
HEIGHT: 67.5 IN | SYSTOLIC BLOOD PRESSURE: 130 MMHG | RESPIRATION RATE: 19 BRPM | HEART RATE: 76 BPM | WEIGHT: 238.1 LBS | DIASTOLIC BLOOD PRESSURE: 76 MMHG | OXYGEN SATURATION: 96 % | TEMPERATURE: 98 F

## 2023-11-13 DIAGNOSIS — Z98.890 OTHER SPECIFIED POSTPROCEDURAL STATES: Chronic | ICD-10-CM

## 2023-11-13 DIAGNOSIS — Z96.652 PRESENCE OF LEFT ARTIFICIAL KNEE JOINT: Chronic | ICD-10-CM

## 2023-11-13 DIAGNOSIS — Z86.010 PERSONAL HISTORY OF COLONIC POLYPS: ICD-10-CM

## 2023-11-13 PROCEDURE — 45385 COLONOSCOPY W/LESION REMOVAL: CPT | Mod: PT

## 2023-11-13 PROCEDURE — 45385 COLONOSCOPY W/LESION REMOVAL: CPT

## 2023-11-13 PROCEDURE — 88305 TISSUE EXAM BY PATHOLOGIST: CPT

## 2023-11-13 PROCEDURE — 88305 TISSUE EXAM BY PATHOLOGIST: CPT | Mod: 26

## 2023-11-13 DEVICE — NET RETRV ROT ROTH 2.5MMX230CM: Type: IMPLANTABLE DEVICE | Status: FUNCTIONAL

## 2023-11-13 RX ORDER — OMEGA-3 ACID ETHYL ESTERS 1 G
1 CAPSULE ORAL
Qty: 0 | Refills: 0 | DISCHARGE

## 2023-11-13 NOTE — ASU PREOP CHECKLIST - BOWEL PREP
-Presents with epigastric/substernal CP, concerning for UA  -Serial troponin negative  -EKG reviewed showed SR, possible anterior infarct, T wave inversions anteriorly  -Prior MPI stress test 6/23 negative for ischemia; echo 6/23 with normal EF  -Continue ASA, Plavix, CCB, statin, ARB  -C today by Dr. Juarez. All risks, benefits, and treatment alternatives to explained to patient in detail. All questions answered. She has agreed to proceed. Discussed with her daughter who was at bedside as well.    9/13/23  -s/p [facility] with successful PCI of ISR of RCA  -Stable, no CP/anginal symptoms  -Continue ASA, Plavix, CCB, statin, ARB  -Coreg added  -Follow-up in clinic   done

## 2023-11-13 NOTE — PRE PROCEDURE NOTE - PRE PROCEDURE EVALUATION
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      S/P arthroscopy of left shoulder  3/2016      History of hip surgery  s/p excision of right hip fatty tumor 2015        ALLERGIES:  No Known Allergies    HOME MEDICATIONS:  Fish Oil oral capsule: 1 cap(s) orally once a day, last dose 4/27/22  Multiple Vitamins oral tablet: 1 tab(s) orally once a day, last dose 4/27/22  vitamin b complex: 1 tab(s) orally once a day    AICD/PPM: [ ] yes   [x ] no    PERTINENT LAB DATA:                      PHYSICAL EXAMINATION:    T(C): --  HR: --  BP: --  RR: --  SpO2: --    Constitutional: NAD  HEENT: PERRLA, EOMI,    Neck:  No JVD  Respiratory: CTAB/L  Cardiovascular: S1 and S2  Gastrointestinal: BS+, soft, NT/ND  Extremities: No peripheral edema  Neurological: A/O x 3, no focal deficits  Psychiatric: Normal mood, normal affect  Skin: No rashes    ASA Class: I [ ]  II [x ]  III [ ]  IV [ ]    COMMENTS:    The patient is a suitable candidate for the planned procedure unless box checked [ ]  No, explain:

## 2023-11-13 NOTE — ASU DISCHARGE PLAN (ADULT/PEDIATRIC) - NS MD DC FALL RISK RISK
For information on Fall & Injury Prevention, visit: [email or web address] OR  [email or web address] OR  https://www.cdc.gov/steadi/patient.html

## 2023-11-14 ENCOUNTER — NON-APPOINTMENT (OUTPATIENT)
Age: 78
End: 2023-11-14

## 2023-11-15 LAB
SURGICAL PATHOLOGY STUDY: SIGNIFICANT CHANGE UP
SURGICAL PATHOLOGY STUDY: SIGNIFICANT CHANGE UP

## 2023-12-14 NOTE — PHYSICAL THERAPY INITIAL EVALUATION ADULT - GAIT PATTERN USED, PT EVAL
Problem: Pain  Goal: #Acceptable pain level achieved/maintained at rest using NRS/Faces  Description: This goal is used for patients who can self-report.  Acceptable means the level is at or below the identified comfort/function goal.  Outcome: Outcome Met, Continue evaluating goal progress toward completion  Goal: # Acceptable pain level achieved/maintained at rest using NRS/Faces without oversedation (opioid naive or PCA/Epidural infusion)  Description: This goal is used if Opioid-naïve or on PCA/Epidural Infusion.  Outcome: Outcome Met, Continue evaluating goal progress toward completion  Goal: # Acceptable pain level achieved/maintained with activity using NRS/Faces  Description: This goal is used for patients who can self-report and are not achieving acceptable pain control during activity.  Outcome: Outcome Met, Continue evaluating goal progress toward completion     Problem: Breathing Pattern Ineffective  Goal: Air exchange is effective, demonstrated by Sp02 sat of greater then or = 92% (or as ordered)  Outcome: Outcome Met, Continue evaluating goal progress toward completion  Goal: Respiratory pattern is quiet and regular without report of SOB  Outcome: Outcome Met, Continue evaluating goal progress toward completion  Goal: Breathing pattern demonstrates minimal apnea during sleep with appropriate use of airway pressure support devices  Outcome: Outcome Met, Continue evaluating goal progress toward completion  Goal: Verbalizes/demonstrates effective breathing management strategies  Description: Document education using the patient education activity.   Outcome: Outcome Met, Continue evaluating goal progress toward completion     Problem: Pneumonia  Goal: S/S of acute pneumonia are resolved  Description: If acute pneumonia is present, monitor for resolution of fever, cough, secretions and other test values based on presentation.  Outcome: Outcome Met, Continue evaluating goal progress toward  completion  Goal: Verbalizes understanding of pneumonia, treatment, and ongoing prevention  Description: Document on Patient Education Activity  Outcome: Outcome Met, Continue evaluating goal progress toward completion      3-point gait

## 2024-07-28 ENCOUNTER — NON-APPOINTMENT (OUTPATIENT)
Age: 79
End: 2024-07-28

## 2024-07-29 ENCOUNTER — APPOINTMENT (OUTPATIENT)
Dept: ORTHOPEDIC SURGERY | Facility: CLINIC | Age: 79
End: 2024-07-29
Payer: MEDICARE

## 2024-07-29 VITALS
WEIGHT: 236 LBS | BODY MASS INDEX: 37.04 KG/M2 | HEART RATE: 69 BPM | SYSTOLIC BLOOD PRESSURE: 162 MMHG | HEIGHT: 67 IN | DIASTOLIC BLOOD PRESSURE: 94 MMHG

## 2024-07-29 DIAGNOSIS — Z96.652 PRESENCE OF LEFT ARTIFICIAL KNEE JOINT: ICD-10-CM

## 2024-07-29 DIAGNOSIS — Z96.651 PRESENCE OF RIGHT ARTIFICIAL KNEE JOINT: ICD-10-CM

## 2024-07-29 DIAGNOSIS — S80.02XA CONTUSION OF LEFT KNEE, INITIAL ENCOUNTER: ICD-10-CM

## 2024-07-29 PROCEDURE — 73562 X-RAY EXAM OF KNEE 3: CPT | Mod: 50

## 2024-07-29 PROCEDURE — 99213 OFFICE O/P EST LOW 20 MIN: CPT

## (undated) DEVICE — CLAMP BX HOT RAD JAW 3

## (undated) DEVICE — SAW BLADE STRYKER SAGITTAL 3 HOLE OSCILLATING

## (undated) DEVICE — VENODYNE/SCD SLEEVE CALF MEDIUM

## (undated) DEVICE — BIOPSY FORCEP RADIAL JAW 4 STANDARD WITH NEEDLE

## (undated) DEVICE — SAW BLADE STRYKER SAGITTAL DUAL CUT 25X89X90

## (undated) DEVICE — PACK LIJ BASIC ORTHO

## (undated) DEVICE — SUT VICRYL 0 27" OS-6 UNDYED

## (undated) DEVICE — WARMING BLANKET UPPER ADULT

## (undated) DEVICE — SOL IRR POUR H2O 1500ML

## (undated) DEVICE — TAPE SILK 3"

## (undated) DEVICE — SUT VICRYL 2-0 27" FS-1 UNDYED

## (undated) DEVICE — DRSG COMBINE 5X9"

## (undated) DEVICE — DRAPE 3/4 SHEET 52X76"

## (undated) DEVICE — POLY TRAP ETRAP

## (undated) DEVICE — STAPLER SKIN VISI-STAT 35 WIDE

## (undated) DEVICE — GLV 7.5 PROTEXIS (WHITE)

## (undated) DEVICE — ELCTR GROUNDING PAD ADULT COVIDIEN

## (undated) DEVICE — TOURNIQUET CUFF 34" DUAL PORT W PLC

## (undated) DEVICE — SUT VICRYL 2-0 27" CP-1 UNDYED

## (undated) DEVICE — Device

## (undated) DEVICE — IRRIGATOR BIO SHIELD

## (undated) DEVICE — CANISTER DISPOSABLE THIN WALL 3000CC

## (undated) DEVICE — TUBING SUCTION CONN 6FT STERILE

## (undated) DEVICE — CATH IV SAFE BC 22G X 1" (BLUE)

## (undated) DEVICE — TOURNIQUET ESMARK 6"

## (undated) DEVICE — PROTECTOR HEEL / ELBOW

## (undated) DEVICE — POSITIONER STRAP ARMBOARD VELCRO TS-30

## (undated) DEVICE — CATH IV SAFE BC 20G X 1.16" (PINK)

## (undated) DEVICE — TUBING IV SET GRAVITY 3Y 100" MACRO

## (undated) DEVICE — PACK IV START WITH CHG

## (undated) DEVICE — LABELS BLANK W PEN

## (undated) DEVICE — DRSG COBAN 6"

## (undated) DEVICE — SUT VICRYL 1 36" CTX UNDYED

## (undated) DEVICE — TUBING SUCTION 20FT

## (undated) DEVICE — DRSG ACE BANDAGE 6"

## (undated) DEVICE — FOLEY HOLDER STATLOCK 2 WAY ADULT

## (undated) DEVICE — SOL INJ NS 0.9% 500ML 2 PORT

## (undated) DEVICE — SNARE EXACTO COLD 9MMX230CM

## (undated) DEVICE — SOL IRR POUR NS 0.9% 1000ML

## (undated) DEVICE — PREP CHLORAPREP HI-LITE ORANGE 26ML

## (undated) DEVICE — TUBING CAP SET ENDO 24HR USE GI

## (undated) DEVICE — ENDOCUFF VISION SZ 2 LG GRN

## (undated) DEVICE — GLV 8 PROTEXIS (CREAM) MICRO

## (undated) DEVICE — SYR LUER LOK 50CC

## (undated) DEVICE — SOL IRR BAG NS 0.9% 3000ML

## (undated) DEVICE — SUCTION YANKAUER NO CONTROL VENT

## (undated) DEVICE — PACK TOTAL JOINT

## (undated) DEVICE — FORCEP RADIAL JAW 4 JUMBO 2.8MM 3.2MM 240CM ORANGE DISP

## (undated) DEVICE — SENSOR O2 FINGER ADULT

## (undated) DEVICE — DRSG STOCKINETTE IMPERVIOUS XL

## (undated) DEVICE — BRUSH COLONOSCOPY CYTOLOGY

## (undated) DEVICE — HANDPIECE IRRIGATION W/ BATTERY PACKAND HIGH FLOW TIP

## (undated) DEVICE — DRAPE U POLY BLUE 60"X60"

## (undated) DEVICE — DRAPE IOBAN 33" X 23"

## (undated) DEVICE — HOOD T5 PEELAWAY